# Patient Record
Sex: FEMALE | Race: WHITE | NOT HISPANIC OR LATINO | Employment: UNEMPLOYED | URBAN - METROPOLITAN AREA
[De-identification: names, ages, dates, MRNs, and addresses within clinical notes are randomized per-mention and may not be internally consistent; named-entity substitution may affect disease eponyms.]

---

## 2017-09-25 ENCOUNTER — GENERIC CONVERSION - ENCOUNTER (OUTPATIENT)
Dept: OTHER | Facility: OTHER | Age: 32
End: 2017-09-25

## 2017-09-25 DIAGNOSIS — R53.1 WEAKNESS: ICD-10-CM

## 2017-10-11 ENCOUNTER — GENERIC CONVERSION - ENCOUNTER (OUTPATIENT)
Dept: OTHER | Facility: OTHER | Age: 32
End: 2017-10-11

## 2017-10-11 LAB
A/G RATIO (HISTORICAL): 1.7 (ref 1.2–2.2)
ALBUMIN SERPL BCP-MCNC: 4.3 G/DL (ref 3.5–5.5)
ALP SERPL-CCNC: 56 IU/L (ref 39–117)
ALT SERPL W P-5'-P-CCNC: 7 IU/L (ref 0–32)
AST SERPL W P-5'-P-CCNC: 11 IU/L (ref 0–40)
BILIRUB SERPL-MCNC: 0.3 MG/DL (ref 0–1.2)
BUN SERPL-MCNC: 12 MG/DL (ref 6–20)
BUN/CREA RATIO (HISTORICAL): 17 (ref 9–23)
CALCIUM SERPL-MCNC: 9.1 MG/DL (ref 8.7–10.2)
CHLORIDE SERPL-SCNC: 104 MMOL/L (ref 96–106)
CHOLEST SERPL-MCNC: 155 MG/DL (ref 100–199)
CO2 SERPL-SCNC: 24 MMOL/L (ref 18–29)
CREAT SERPL-MCNC: 0.7 MG/DL (ref 0.57–1)
DEPRECATED RDW RBC AUTO: 14.3 % (ref 12.3–15.4)
EGFR AFRICAN AMERICAN (HISTORICAL): 134 ML/MIN/1.73
EGFR-AMERICAN CALC (HISTORICAL): 117 ML/MIN/1.73
GLUCOSE SERPL-MCNC: 92 MG/DL (ref 65–99)
HCT VFR BLD AUTO: 35.1 % (ref 34–46.6)
HDLC SERPL-MCNC: 58 MG/DL
HGB BLD-MCNC: 11.5 G/DL (ref 11.1–15.9)
LDLC SERPL CALC-MCNC: 89 MG/DL (ref 0–99)
MCH RBC QN AUTO: 26.6 PG (ref 26.6–33)
MCHC RBC AUTO-ENTMCNC: 32.8 G/DL (ref 31.5–35.7)
MCV RBC AUTO: 81 FL (ref 79–97)
PLATELET # BLD AUTO: 221 X10E3/UL (ref 150–379)
POTASSIUM SERPL-SCNC: 4.1 MMOL/L (ref 3.5–5.2)
RBC (HISTORICAL): 4.33 X10E6/UL (ref 3.77–5.28)
SODIUM SERPL-SCNC: 141 MMOL/L (ref 134–144)
TOT. GLOBULIN, SERUM (HISTORICAL): 2.5 G/DL (ref 1.5–4.5)
TOTAL PROTEIN (HISTORICAL): 6.8 G/DL (ref 6–8.5)
TRIGL SERPL-MCNC: 42 MG/DL (ref 0–149)
VIT B12 SERPL-MCNC: 720 PG/ML (ref 211–946)
WBC # BLD AUTO: 4.1 X10E3/UL (ref 3.4–10.8)

## 2017-10-12 LAB
25(OH)D3 SERPL-MCNC: 28.7 NG/ML (ref 30–100)
ANTINUCLEAR ANTIBODIES, IFA (HISTORICAL): POSITIVE
CYCLIC CITRULLINATED PEPTIDE ANTIBODY (HISTORICAL): 4 UNITS (ref 0–19)
HBA1C MFR BLD HPLC: 5.1 % (ref 4.8–5.6)
HOMOGENEOUS PATTERN (HISTORICAL): ABNORMAL
NOTE: (HISTORICAL): ABNORMAL
SPECKLED PATTERN (HISTORICAL): ABNORMAL
TSH SERPL DL<=0.05 MIU/L-ACNC: 2.21 UIU/ML (ref 0.45–4.5)

## 2017-10-17 ENCOUNTER — GENERIC CONVERSION - ENCOUNTER (OUTPATIENT)
Dept: OTHER | Facility: OTHER | Age: 32
End: 2017-10-17

## 2017-10-17 LAB — CYCLIC CITRULLINATED PEPTIDE ANTIBODY (HISTORICAL): 4

## 2017-10-18 ENCOUNTER — GENERIC CONVERSION - ENCOUNTER (OUTPATIENT)
Dept: OTHER | Facility: OTHER | Age: 32
End: 2017-10-18

## 2018-01-15 NOTE — RESULT NOTES
Verified Results  (1) COMPREHENSIVE METABOLIC PANEL [id number] 42:22UK Joanna Dash duplicate report has been generated due to demographic updates       Test Name Result Flag Reference   Glucose, Serum 92 mg/dL  65-99   BUN 12 mg/dL  6-20   Creatinine, Serum 0 70 mg/dL  0 57-1 00   BUN/Creatinine Ratio 17  9-23   Sodium, Serum 141 mmol/L  134-144   Potassium, Serum 4 1 mmol/L  3 5-5 2   Chloride, Serum 104 mmol/L     Carbon Dioxide, Total 24 mmol/L  18-29   Calcium, Serum 9 1 mg/dL  8 7-10 2   Protein, Total, Serum 6 8 g/dL  6 0-8 5   Albumin, Serum 4 3 g/dL  3 5-5 5   Globulin, Total 2 5 g/dL  1 5-4 5   A/G Ratio 1 7  1 2-2 2   Bilirubin, Total 0 3 mg/dL  0 0-1 2   Alkaline Phosphatase, S 56 IU/L     AST (SGOT) 11 IU/L  0-40   ALT (SGPT) 7 IU/L  0-32   eGFR If NonAfricn Am 117 mL/min/1 73  >59   eGFR If Africn Am 134 mL/min/1 73  >59

## 2018-01-22 VITALS
HEIGHT: 67 IN | WEIGHT: 152 LBS | BODY MASS INDEX: 23.86 KG/M2 | DIASTOLIC BLOOD PRESSURE: 58 MMHG | TEMPERATURE: 98 F | SYSTOLIC BLOOD PRESSURE: 94 MMHG | HEART RATE: 80 BPM

## 2018-08-26 ENCOUNTER — HOSPITAL ENCOUNTER (EMERGENCY)
Facility: HOSPITAL | Age: 33
Discharge: HOME | End: 2018-08-26
Attending: EMERGENCY MEDICINE
Payer: COMMERCIAL

## 2018-08-26 ENCOUNTER — APPOINTMENT (EMERGENCY)
Dept: RADIOLOGY | Facility: HOSPITAL | Age: 33
End: 2018-08-26
Payer: COMMERCIAL

## 2018-08-26 VITALS
WEIGHT: 150 LBS | OXYGEN SATURATION: 97 % | HEIGHT: 66 IN | RESPIRATION RATE: 20 BRPM | SYSTOLIC BLOOD PRESSURE: 141 MMHG | DIASTOLIC BLOOD PRESSURE: 67 MMHG | BODY MASS INDEX: 24.11 KG/M2 | HEART RATE: 91 BPM | TEMPERATURE: 98.4 F

## 2018-08-26 DIAGNOSIS — J18.9 COMMUNITY ACQUIRED PNEUMONIA: Primary | ICD-10-CM

## 2018-08-26 PROCEDURE — 99283 EMERGENCY DEPT VISIT LOW MDM: CPT

## 2018-08-26 PROCEDURE — 71046 X-RAY EXAM CHEST 2 VIEWS: CPT

## 2018-08-26 RX ORDER — LEVOFLOXACIN 500 MG/1
500 TABLET, FILM COATED ORAL EVERY 24 HOURS
Qty: 7 TABLET | Refills: 0 | Status: SHIPPED | OUTPATIENT
Start: 2018-08-26 | End: 2018-09-02

## 2018-08-26 NOTE — ED PROVIDER NOTES
History  Chief Complaint   Patient presents with    Cough     Patient states she has been sick for two weeks with cough, sore throat, fever, and nasal congestion  Patient has been taking OTC medications and it;'s not getting any better     29year old female p/w productive cough  2 weeks  Yellow sputum  No sob  Does not smoke  Was recently visiting sick people in the hospital at [address]  Overall feels well  Has LLL rhonci  A/P: cough  cxr to r/o pna  Uri sx as well  Cough   Cough characteristics:  Productive  Sputum characteristics:  Yellow  Severity:  Moderate  Onset quality:  Gradual  Duration:  2 weeks  Timing:  Constant  Progression:  Waxing and waning  Smoker: no    Context: sick contacts and upper respiratory infection    Relieved by:  Nothing  Worsened by:  Nothing  Associated symptoms: chills and myalgias    Associated symptoms: no chest pain, no diaphoresis, no fever, no headaches, no shortness of breath and no wheezing             None       History reviewed  No pertinent past medical history  History reviewed  No pertinent surgical history  History reviewed  No pertinent family history  I have reviewed and agree with the history as documented  Social History   Substance Use Topics    Smoking status: Never Smoker    Smokeless tobacco: Never Used    Alcohol use No        Review of Systems   Constitutional: Positive for chills  Negative for diaphoresis, fatigue and fever  HENT: Negative for facial swelling and nosebleeds  Eyes: Negative for pain and visual disturbance  Respiratory: Positive for cough  Negative for apnea, shortness of breath and wheezing  Cardiovascular: Negative for chest pain and leg swelling  Gastrointestinal: Negative for abdominal distention, abdominal pain, anal bleeding, blood in stool, nausea, rectal pain and vomiting  Genitourinary: Negative for difficulty urinating, dysuria and flank pain  Musculoskeletal: Positive for myalgias   Negative for back pain, neck pain and neck stiffness  Neurological: Negative for dizziness, syncope, weakness, light-headedness and headaches  All other systems reviewed and are negative  Physical Exam  Physical Exam   Constitutional: She is oriented to person, place, and time  She appears well-developed and well-nourished  No distress  HENT:   Head: Normocephalic and atraumatic  Nose: Nose normal    Eyes: Conjunctivae and EOM are normal  Pupils are equal, round, and reactive to light  No scleral icterus  Neck: Normal range of motion  Neck supple  No JVD present  No tracheal deviation present  No thyromegaly present  Cardiovascular: Normal rate, regular rhythm, normal heart sounds and intact distal pulses  Exam reveals no gallop and no friction rub  Pulmonary/Chest: Effort normal and breath sounds normal  No respiratory distress  She has no wheezes  She has no rales  She exhibits no tenderness  Abdominal: Soft  Bowel sounds are normal  She exhibits no distension and no mass  There is no tenderness  There is no rebound and no guarding  No hernia  Musculoskeletal: Normal range of motion  She exhibits no edema, tenderness or deformity  Neurological: She is alert and oriented to person, place, and time  She has normal reflexes  No cranial nerve deficit  Coordination normal    Skin: Skin is warm and dry  She is not diaphoretic  No erythema  Psychiatric: She has a normal mood and affect  Her behavior is normal    Nursing note and vitals reviewed        Vital Signs  ED Triage Vitals [08/26/18 1224]   Temperature Pulse Respirations Blood Pressure SpO2   98 4 °F (36 9 °C) 91 20 141/67 97 %      Temp src Heart Rate Source Patient Position - Orthostatic VS BP Location FiO2 (%)   -- -- -- -- --      Pain Score       8           Vitals:    08/26/18 1224   BP: 141/67   Pulse: 91       Visual Acuity      ED Medications  Medications - No data to display    Diagnostic Studies  Results Reviewed     None XR chest 2 views   ED Interpretation by Micheal Meek DO (08/26 2381)   lll pna early infiltrate      Final Result by Marguerite Loja MD (08/27 2867)      Interval development of lingular pneumonia          Findings are consistent with emergency provider's preliminary reading                     Workstation performed: [id number]                    Procedures  Procedures       Phone Contacts  ED Phone Contact    ED Course                               MDM  Number of Diagnoses or Management Options  Community acquired pneumonia: new and requires workup  Diagnosis management comments: Likely lll pna, will tx with levaquin  Amount and/or Complexity of Data Reviewed  Tests in the radiology section of CPT®: ordered and reviewed  Independent visualization of images, tracings, or specimens: yes    Patient Progress  Patient progress: stable    CritCare Time    Disposition  Final diagnoses:   Community acquired pneumonia     Time reflects when diagnosis was documented in both MDM as applicable and the Disposition within this note     Time User Action Codes Description Comment    8/26/2018 12:42 PM Rehan Javed Add [J18 9] Community acquired pneumonia       ED Disposition     ED Disposition Condition Comment    Discharge  Roxanna Capellan discharge to home/self care  Condition at discharge: Good        Follow-up Information    None         Discharge Medication List as of 8/26/2018 12:42 PM      START taking these medications    Details   levofloxacin (LEVAQUIN) 500 mg tablet Take 1 tablet (500 mg total) by mouth every 24 hours for 7 days, Starting Sun 8/26/2018, Until Sun 9/2/2018, Print           No discharge procedures on file      ED Provider  Electronically Signed by           Micheal Meek DO  08/27/18 152

## 2018-08-26 NOTE — ED NOTES
Patient has few scattered rhonchi    Patient states she is expectoarting yellow mucous     Elisabeth Seth, DANICA  08/26/18 9623

## 2018-08-26 NOTE — DISCHARGE INSTRUCTIONS
Community Acquired Pneumonia   WHAT YOU NEED TO KNOW:   Community-acquired pneumonia (CAP) is a lung infection that you get outside of a hospital or nursing home setting  Your lungs become inflamed and cannot work well  CAP may be caused by bacteria, viruses, or fungi  DISCHARGE INSTRUCTIONS:   Return to the emergency department if:   · You are confused and cannot think clearly  · You have increased trouble breathing  · Your lips or fingernails turn gray or blue  Contact your healthcare provider if:   · Your symptoms do not get better, or they get worse  · You are urinating less, or not at all  · You have questions or concerns about your condition or care  Medicines:   · Medicines  may be given to treat a bacterial, viral, or fungal infection  You may also be given medicines to dilate your bronchial tubes to help you breathe more easily  · Take your medicine as directed  Contact your healthcare provider if you think your medicine is not helping or if you have side effects  Tell him or her if you are allergic to any medicine  Keep a list of the medicines, vitamins, and herbs you take  Include the amounts, and when and why you take them  Bring the list or the pill bottles to follow-up visits  Carry your medicine list with you in case of an emergency  Follow up with your healthcare provider within 3 days or as directed: You may need another x-ray  Write down your questions so you remember to ask them during your visits  Deep breathing and coughing:  Deep breathing helps open the air passages in your lungs  Coughing helps bring up mucus from your lungs  Take a deep breath and hold the breath as long as you can  Then push the air out of your lungs with a deep, strong cough  Spit out any mucus you have coughed up  Take 10 deep breaths in a row every hour that you are awake  Remember to follow each deep breath with a cough     Do not smoke or allow others to smoke around you:  Nicotine and other chemicals in cigarettes and cigars can cause lung damage  Ask your healthcare provider for information if you currently smoke and need help to quit  E-cigarettes or smokeless tobacco still contain nicotine  Talk to your healthcare provider before you use these products  Manage CAP at home:   · Breathe warm, moist air  This helps loosen mucus  Loosely place a warm, wet washcloth over your nose and mouth  A room humidifier may also help make the air moist     · Drink liquids as directed  Ask your healthcare provider how much liquid to drink each day and which liquids to drink  Liquids help make mucus thin and easier to get out of your body  · Gently tap your chest   This helps loosen mucus so it is easier to cough  Lie with your head lower than your chest several times a day and tap your chest      · Get plenty of rest   Rest helps your body heal   Prevent CAP:   · Wash your hands often with soap and water  Carry germ-killing hand gel with you  You can use the gel to clean your hands when soap and water are not available  Do not touch your eyes, nose, or mouth unless you have washed your hands first      · Clean surfaces often  Clean doorknobs, countertops, cell phones, and other surfaces that are touched often  · Always cover your mouth when you cough  Cough into a tissue or your shirtsleeve so you do not spread germs from your hands  · Try to avoid people who have a cold or the flu  If you are sick, stay away from others as much as possible  · Ask about vaccines  You may need a vaccine to help prevent pneumonia  Get an influenza (flu) vaccine every year as soon as it becomes available  © 2017 [employer] Information is for End User's use only and may not be sold, redistributed or otherwise used for commercial purposes  All illustrations and images included in CareNotes® are the copyrighted property of [employer]  or [employer]    The above information is an  only  It is not intended as medical advice for individual conditions or treatments  Talk to your doctor, nurse or pharmacist before following any medical regimen to see if it is safe and effective for you

## 2018-08-30 ENCOUNTER — VBI (OUTPATIENT)
Dept: ADMINISTRATIVE | Facility: OTHER | Age: 33
End: 2018-08-30

## 2018-09-29 NOTE — TELEPHONE ENCOUNTER
[facility]    ED Visit Information     Ed visit date: 08/26/2018  Diagnosis Description: Community acquired pneumonia  In Network? Yes [address]  Discharge status: Home  Discharged with meds ? Yes  Number of ED visits to date: 1  ED Severity:4     Outreach Information    Outreach successful: Yes 1  Date letter mailed:N/a  Date Finalized:08/30/2018    Care Coordination    Follow up appointment with pcp: no None scheduled  Transportation issues ? No    [employer] type:  7 Day Outreach  Emergent necessity warranted by diagnosis:  No  [facility] PCP:  Yes  Transportation:  Self Transport  Called PCP first?:  No  Feels able to call PCP for urgent problems ?:  Yes  Ever any problems getting appointment with PCP for minor emergency/urgency problems?:  No  Practice Contacted Patient ?:  No  Pt had ED follow up with pcp/staff ?:  No    Seen for follow-up out of network ?:  No  Reason Patient went to ED instead of Urgent Care or PCP?:  Perceived Severity of Illness  Urgent care Education?:  No  08/30/2018 02:03 PM Phone ([employer] Neel Figueroa ([employer]) [phone] (H)   Call Complete - Personal communication with patient:    Patient stated that she is doing well s/p ED visit on 08/26 for Community acquired pneumonia  She had been experiencing difficulty breathing  Cough has improved  Patient was discharged with medication  Patient declined to scheduled a /u appt wit pcp but stated that she will call and get an appt at a later date, if needed  Patient does not meet OPCM criteria and denies any transportation issues  Patient is aware of her nearest [place] [address] urgent care facility

## 2019-03-22 ENCOUNTER — OFFICE VISIT (OUTPATIENT)
Dept: URGENT CARE | Facility: CLINIC | Age: 34
End: 2019-03-22
Payer: COMMERCIAL

## 2019-03-22 VITALS
DIASTOLIC BLOOD PRESSURE: 62 MMHG | HEART RATE: 90 BPM | SYSTOLIC BLOOD PRESSURE: 118 MMHG | OXYGEN SATURATION: 98 % | TEMPERATURE: 97.8 F | BODY MASS INDEX: 24.33 KG/M2 | RESPIRATION RATE: 16 BRPM | HEIGHT: 67 IN | WEIGHT: 155 LBS

## 2019-03-22 DIAGNOSIS — J02.9 SORE THROAT: Primary | ICD-10-CM

## 2019-03-22 LAB — S PYO AG THROAT QL: NEGATIVE

## 2019-03-22 PROCEDURE — 87430 STREP A AG IA: CPT | Performed by: PREVENTIVE MEDICINE

## 2019-03-22 PROCEDURE — 99283 EMERGENCY DEPT VISIT LOW MDM: CPT | Performed by: PREVENTIVE MEDICINE

## 2019-03-22 PROCEDURE — G0382 LEV 3 HOSP TYPE B ED VISIT: HCPCS | Performed by: PREVENTIVE MEDICINE

## 2019-03-22 NOTE — PROGRESS NOTES
[facility]        NAME: Verena Jansen is a 34 y o  female  : 1985    MRN: [id number]  DATE: 2019  TIME: 3:44 PM    Assessment and Plan   Sore throat [J02 9]  1  Sore throat  POCT rapid strepA         Patient Instructions       Follow up with PCP in 3-5 days  Proceed to  ER if symptoms worsen  Chief Complaint     Chief Complaint   Patient presents with    Sore Throat     Began two days ago  fever (tmax 103), sore throat and left ear pain         History of Present Illness       She was sick last week with head cold type symptoms  Began to get better and now has developed sore throat eye intermittent fever  Review of Systems   Review of Systems   Constitutional: Positive for fatigue and fever  HENT: Positive for sore throat  Current Medications     No current outpatient medications on file  Current Allergies     Allergies as of 2019    (No Known Allergies)            The following portions of the patient's history were reviewed and updated as appropriate: allergies, current medications, past family history, past medical history, past social history, past surgical history and problem list      Past Medical History:   Diagnosis Date    Patient denies medical problems        No past surgical history on file  No family history on file  Medications have been verified  Objective   /62   Pulse 90   Temp 97 8 °F (36 6 °C)   Resp 16   Ht 5' 7" (1 702 m)   Wt 70 3 kg (155 lb)   SpO2 98%   BMI 24 28 kg/m²        Physical Exam     Physical Exam   HENT:   Right Ear: Tympanic membrane normal    Left Ear: Tympanic membrane normal    Mouth/Throat: Oropharynx is clear and moist    Cardiovascular: Normal heart sounds  Pulmonary/Chest: Breath sounds normal    Lymphadenopathy:     She has no cervical adenopathy       Strep screen negative at 5 minutes

## 2019-03-22 NOTE — PATIENT INSTRUCTIONS
Hot salt water gargles may be helpful  Cepacol lozenges for pain  Motrin or Tylenol for fever, chills or aches  Follow up with PCP in 3-5 days if no improvement in symptoms  We will call you with a throat culture turns positive

## 2019-03-24 LAB — B-HEM STREP SPEC QL CULT: NEGATIVE

## 2019-09-02 ENCOUNTER — HOSPITAL ENCOUNTER (EMERGENCY)
Facility: HOSPITAL | Age: 34
Discharge: HOME | End: 2019-09-02
Attending: EMERGENCY MEDICINE | Admitting: EMERGENCY MEDICINE
Payer: COMMERCIAL

## 2019-09-02 VITALS
HEIGHT: 67 IN | HEART RATE: 66 BPM | WEIGHT: 157 LBS | OXYGEN SATURATION: 98 % | BODY MASS INDEX: 24.64 KG/M2 | RESPIRATION RATE: 18 BRPM | TEMPERATURE: 98.2 F | DIASTOLIC BLOOD PRESSURE: 62 MMHG | SYSTOLIC BLOOD PRESSURE: 116 MMHG

## 2019-09-02 DIAGNOSIS — S61.012A LACERATION OF LEFT THUMB: Primary | ICD-10-CM

## 2019-09-02 PROCEDURE — 12001 RPR S/N/AX/GEN/TRNK 2.5CM/<: CPT | Performed by: PHYSICIAN ASSISTANT

## 2019-09-02 PROCEDURE — 99283 EMERGENCY DEPT VISIT LOW MDM: CPT

## 2019-09-02 PROCEDURE — 99282 EMERGENCY DEPT VISIT SF MDM: CPT | Performed by: PHYSICIAN ASSISTANT

## 2019-09-02 PROCEDURE — 90715 TDAP VACCINE 7 YRS/> IM: CPT | Performed by: PHYSICIAN ASSISTANT

## 2019-09-02 PROCEDURE — 90471 IMMUNIZATION ADMIN: CPT

## 2019-09-02 RX ORDER — FERROUS SULFATE 325(65) MG
325 TABLET ORAL
COMMUNITY
End: 2022-03-18 | Stop reason: ALTCHOICE

## 2019-09-02 RX ORDER — LIDOCAINE HYDROCHLORIDE 10 MG/ML
5 INJECTION, SOLUTION EPIDURAL; INFILTRATION; INTRACAUDAL; PERINEURAL ONCE
Status: COMPLETED | OUTPATIENT
Start: 2019-09-02 | End: 2019-09-02

## 2019-09-02 RX ORDER — BIOTIN 1 MG
1000 TABLET ORAL 3 TIMES DAILY
COMMUNITY
End: 2019-09-10 | Stop reason: ALTCHOICE

## 2019-09-02 RX ORDER — GINSENG 100 MG
1 CAPSULE ORAL ONCE
Status: COMPLETED | OUTPATIENT
Start: 2019-09-02 | End: 2019-09-02

## 2019-09-02 RX ADMIN — TETANUS TOXOID, REDUCED DIPHTHERIA TOXOID AND ACELLULAR PERTUSSIS VACCINE, ADSORBED 0.5 ML: 5; 2.5; 8; 8; 2.5 SUSPENSION INTRAMUSCULAR at 12:56

## 2019-09-02 RX ADMIN — BACITRACIN 1 SMALL APPLICATION: 500 OINTMENT TOPICAL at 12:58

## 2019-09-02 RX ADMIN — LIDOCAINE HYDROCHLORIDE 5 ML: 10 INJECTION, SOLUTION EPIDURAL; INFILTRATION; INTRACAUDAL; PERINEURAL at 12:58

## 2019-09-02 NOTE — ED PROVIDER NOTES
History  Chief Complaint   Patient presents with    Hand Laceration     Patient states she was washing dishes and the glass broke and cut her L hand     Patient is a 29 y/o F that presents to the ED with laceration to left dorsal thumb that occurred 30 minutes ago at home  Patient was washing a cup and it broke and cut her thumb  No Numbness or tingling  Last tetanus is unknown  History provided by:  Patient  Laceration   Location:  Finger  Finger laceration location:  L thumb  Length:  2 5cm  Depth: Through dermis  Quality: straight    Time since incident:  30 minutes  Laceration mechanism:  Broken glass  Pain details:     Quality:  Aching    Severity:  No pain    Timing:  Constant  Foreign body present:  No foreign bodies  Relieved by:  Nothing  Worsened by:  Nothing  Ineffective treatments:  None tried  Tetanus status:  Out of date  Associated symptoms: no fever, no numbness and no swelling        Prior to Admission Medications   Prescriptions Last Dose Informant Patient Reported? Taking? Biotin 1000 MCG tablet   Yes No   Sig: Take 1,000 mcg by mouth Three times a day   Cholecalciferol 2000 units TABS   Yes No   Sig: Take by mouth   ferrous sulfate 325 (65 Fe) mg tablet   Yes No   Sig: Take 325 mg by mouth      Facility-Administered Medications: None       Past Medical History:   Diagnosis Date    Patient denies medical problems        Past Surgical History:   Procedure Laterality Date    OVARIAN CYST REMOVAL         History reviewed  No pertinent family history  I have reviewed and agree with the history as documented  Social History     Tobacco Use    Smoking status: Never Smoker    Smokeless tobacco: Never Used   Substance Use Topics    Alcohol use: No    Drug use: No        Review of Systems   Constitutional: Negative for chills and fever  Skin: Positive for wound  Neurological: Negative for dizziness, weakness and numbness     All other systems reviewed and are negative  Physical Exam  Physical Exam   Constitutional: She is oriented to person, place, and time  She appears well-developed and well-nourished  HENT:   Head: Normocephalic and atraumatic  Eyes: Conjunctivae are normal    Cardiovascular: Normal rate, regular rhythm and normal heart sounds  Pulses:       Radial pulses are 2+ on the left side  Pulmonary/Chest: Effort normal and breath sounds normal    Musculoskeletal:        Left hand: She exhibits tenderness and laceration  She exhibits normal range of motion, no bony tenderness and no deformity  Normal sensation noted  Normal strength noted  Hands:  Neurological: She is alert and oriented to person, place, and time  She has normal strength  No sensory deficit  Skin: Skin is warm and dry  Laceration (left dorsal thumb) noted  No rash noted  She is not diaphoretic  No pallor  Nursing note and vitals reviewed  Vital Signs  ED Triage Vitals [09/02/19 1247]   Temperature Pulse Respirations Blood Pressure SpO2   98 2 °F (36 8 °C) 66 18 116/62 98 %      Temp src Heart Rate Source Patient Position - Orthostatic VS BP Location FiO2 (%)   -- -- -- -- --      Pain Score       Worst Possible Pain           Vitals:    09/02/19 1247   BP: 116/62   Pulse: 66         Visual Acuity      ED Medications  Medications   lidocaine (PF) (XYLOCAINE-MPF) 1 % injection 5 mL (5 mL Infiltration Given 9/2/19 1258)   bacitracin topical ointment 1 small application (1 small application Topical Given 9/2/19 1258)   tetanus-diphtheria-acellular pertussis (BOOSTRIX) IM injection 0 5 mL (0 5 mL Intramuscular Given 9/2/19 1256)       Diagnostic Studies  Results Reviewed     None                 No orders to display              Procedures  Laceration repair  Date/Time: 9/2/2019 1:10 PM  Performed by: Demetria Luna PA-C  Authorized by: Demetria Luna PA-C   Consent: Verbal consent obtained    Risks and benefits: risks, benefits and alternatives were discussed  Consent given by: patient  Body area: upper extremity  Location details: left thumb  Laceration length: 2 5 cm  Foreign bodies: no foreign bodies  Anesthesia: local infiltration    Anesthesia:  Local Anesthetic: lidocaine 1% without epinephrine  Anesthetic total: 2 mL      Procedure Details:  Preparation: Patient was prepped and draped in the usual sterile fashion  Irrigation solution: saline  Irrigation method: tap  Amount of cleaning: standard  Debridement: none  Degree of undermining: none  Skin closure: 4-0 nylon  Number of sutures: 7  Technique: simple  Approximation difficulty: simple  Dressing: antibiotic ointment and gauze roll  Patient tolerance: Patient tolerated the procedure well with no immediate complications             ED Course                               MDM  Number of Diagnoses or Management Options  Laceration of left thumb: minor  Patient Progress  Patient progress: stable      Disposition  Final diagnoses:   Laceration of left thumb     Time reflects when diagnosis was documented in both MDM as applicable and the Disposition within this note     Time User Action Codes Description Comment    9/2/2019  1:18 PM Evelyn Pope Add [I11 311M] Laceration of left thumb       ED Disposition     ED Disposition Condition Date/Time Comment    Discharge Stable Mon Sep 2, 2019  1:18 PM Roxanna Capellan discharge to home/self care  Follow-up Information     Follow up With Specialties Details Why Contact Info    Zak Gupta MD Internal Medicine Call in 1 week For suture removal [facility]  [address]  [phone]            Patient's Medications   Discharge Prescriptions    No medications on file     No discharge procedures on file      ED Provider  Electronically Signed by           Dedra Chicas PA-C  09/02/19 9042

## 2019-09-02 NOTE — DISCHARGE INSTRUCTIONS
Rest, elevate hand  Tylenol/motrin for discomfort  Keep wound clean and dry for next 2 days, then remove bandage and clean daily with soap and water and apply antibiotic ointment    Follow up with family doctor in 7-10 days for suture removal

## 2019-09-06 ENCOUNTER — VBI (OUTPATIENT)
Dept: ADMINISTRATIVE | Facility: OTHER | Age: 34
End: 2019-09-06

## 2019-09-06 NOTE — TELEPHONE ENCOUNTER
Alexandre Werner    ED Visit Information     Ed visit date: 9/2/2019  Diagnosis Description: Laceration of left thumb  In Network? Yes [facility]  Discharge status: Home  Discharged with meds ? No  Number of ED visits to date: 1  ED Severity:n/a     Outreach Information    Outreach successful: No 1  Date letter mailed:n/a  Date Finalized:9/6/2019    Care Coordination    Follow up appointment with pcp: no No ED f/u appt scheduled  Transportation issues ? NA    [employer] type:  7 Day Outreach  Patient refsued the answer questions:  Yes  Emergent necessity warranted by diagnosis:  No  [facility] PCP:  Yes  Transportation:  Friend/Family Transport  09/06/2019 03:38 PM Phone ([employer]) Rosmery Alonzo (Self) [phone] (H)   Call Complete  Personal communication with patient regarding recent ED visit on 9/2 for Laceration of left thumb  Patient was discharged without medication and advised to follow up with PCP for suture removal (7)  Patient stated that her thumb is doing better today  I was not able to accommodate patient with scheduling an appt due to the strict guidelines we most follow  Patient stated that she will call to schedule  Patient was not able to answer follow up questions due to her children arriving home from school at the time of my call

## 2019-09-10 ENCOUNTER — OFFICE VISIT (OUTPATIENT)
Dept: FAMILY MEDICINE CLINIC | Facility: HOSPITAL | Age: 34
End: 2019-09-10
Payer: COMMERCIAL

## 2019-09-10 VITALS
SYSTOLIC BLOOD PRESSURE: 96 MMHG | DIASTOLIC BLOOD PRESSURE: 66 MMHG | BODY MASS INDEX: 24.99 KG/M2 | WEIGHT: 159.2 LBS | HEART RATE: 73 BPM | HEIGHT: 67 IN

## 2019-09-10 DIAGNOSIS — S61.412D LACERATION OF LEFT HAND WITHOUT FOREIGN BODY, SUBSEQUENT ENCOUNTER: ICD-10-CM

## 2019-09-10 DIAGNOSIS — S36.039A: Primary | ICD-10-CM

## 2019-09-10 PROCEDURE — 99213 OFFICE O/P EST LOW 20 MIN: CPT | Performed by: PHYSICIAN ASSISTANT

## 2019-09-10 NOTE — PROGRESS NOTES
Assessment/Plan:    Left hand injury/laceration-  Follow up from ER visit 9/2/19  Subjective:      Patient ID: Lamar Antunez is a 30 y o  female  30year old white female presents to have 7 sutures removed from left thumb area  Went to ER on 9/2/19 after injury, with broken cup at home, had laceration repair in ER same day as injury  No increased pain, discharge or swelling  Suture / Staple Removal           Review of Systems   Constitutional: Negative for chills, diaphoresis, fatigue and fever  Respiratory: Negative for cough and shortness of breath  Gastrointestinal: Negative for abdominal pain, nausea and vomiting  Neurological: Negative for weakness and numbness  Objective:      BP 96/66   Pulse 73   Ht 5' 7" (1 702 m)   Wt 72 2 kg (159 lb 3 2 oz)   LMP 09/02/2019   BMI 24 93 kg/m²          Physical Exam   Constitutional: She is oriented to person, place, and time  She appears well-developed and well-nourished  No distress  Pulmonary/Chest: Effort normal and breath sounds normal  No stridor  No respiratory distress  She has no wheezes  She has no rales  Musculoskeletal: Normal range of motion  She exhibits no edema, tenderness or deformity  Laceration repaired with 7 sutures  No sign of infection  Neurological: She is alert and oriented to person, place, and time  Skin: She is not diaphoretic  Nursing note and vitals reviewed

## 2019-09-10 NOTE — PATIENT INSTRUCTIONS
Area cleaned with Betadine, and Peroxide, attempted to remove sutures but skin not healed well, easily  when 1st suture pulled off  Mupirocin placed on skin and area bandaged  Patient to return Friday

## 2019-09-18 ENCOUNTER — OFFICE VISIT (OUTPATIENT)
Dept: FAMILY MEDICINE CLINIC | Facility: HOSPITAL | Age: 34
End: 2019-09-18
Payer: COMMERCIAL

## 2019-09-18 VITALS
HEIGHT: 67 IN | SYSTOLIC BLOOD PRESSURE: 108 MMHG | BODY MASS INDEX: 25.3 KG/M2 | DIASTOLIC BLOOD PRESSURE: 60 MMHG | HEART RATE: 82 BPM | WEIGHT: 161.2 LBS

## 2019-09-18 DIAGNOSIS — Z09: Primary | ICD-10-CM

## 2019-09-18 PROCEDURE — 99213 OFFICE O/P EST LOW 20 MIN: CPT | Performed by: PHYSICIAN ASSISTANT

## 2019-09-18 PROCEDURE — 3008F BODY MASS INDEX DOCD: CPT | Performed by: PHYSICIAN ASSISTANT

## 2019-09-18 NOTE — PROGRESS NOTES
Assessment/Plan:         Diagnoses and all orders for this visit:    Examination, follow-up for injury  -     Suture removal        Subjective:      Patient ID: Mildred Tolbert is a 30 y o  female  30year old white female presents to have sutures removed  No increased pain, swelling or discharge  Review of Systems   Constitutional: Negative for chills, diaphoresis, fatigue and fever  Respiratory: Negative for cough and shortness of breath            Objective:      /60 (BP Location: Right arm, Patient Position: Sitting, Cuff Size: Standard)   Pulse 82   Ht 5' 7" (1 702 m)   Wt 73 1 kg (161 lb 3 2 oz)   LMP 09/02/2019   BMI 25 25 kg/m²          Physical Exam

## 2019-09-18 NOTE — PROGRESS NOTES
Suture removal  Date/Time: 9/18/2019 10:29 AM  Performed by: Paras Sargent PA-C  Authorized by: Paras Sargent PA-C     Patient location:  Clinic  Other Assisting Provider: No    Consent:     Consent obtained:  Verbal    Consent given by:  Patient    Risks discussed:  Wound separation  Universal protocol:     Patient identity confirmed:  Verbally with patient  Location:     Laterality:  Left    Location:  Upper extremity  Procedure details: Tools used:  Suture removal kit    Wound appearance:  No sign(s) of infection  Post-procedure details:     Post-removal:  Antibiotic ointment applied, dressing applied and Band-Aid applied    Patient tolerance of procedure:   Tolerated well, no immediate complications

## 2019-09-20 ENCOUNTER — TELEPHONE (OUTPATIENT)
Dept: FAMILY MEDICINE CLINIC | Facility: HOSPITAL | Age: 34
End: 2019-09-20

## 2022-03-18 ENCOUNTER — OFFICE VISIT (OUTPATIENT)
Dept: FAMILY MEDICINE CLINIC | Facility: HOSPITAL | Age: 37
End: 2022-03-18
Payer: COMMERCIAL

## 2022-03-18 VITALS
DIASTOLIC BLOOD PRESSURE: 68 MMHG | BODY MASS INDEX: 26.93 KG/M2 | OXYGEN SATURATION: 98 % | HEIGHT: 67 IN | WEIGHT: 171.6 LBS | HEART RATE: 74 BPM | SYSTOLIC BLOOD PRESSURE: 112 MMHG

## 2022-03-18 DIAGNOSIS — R53.82 CHRONIC FATIGUE: ICD-10-CM

## 2022-03-18 DIAGNOSIS — N92.1 MENORRHAGIA WITH IRREGULAR CYCLE: ICD-10-CM

## 2022-03-18 DIAGNOSIS — Z00.00 ANNUAL PHYSICAL EXAM: Primary | ICD-10-CM

## 2022-03-18 DIAGNOSIS — R45.86 MOOD SWINGS: ICD-10-CM

## 2022-03-18 PROCEDURE — 3725F SCREEN DEPRESSION PERFORMED: CPT | Performed by: INTERNAL MEDICINE

## 2022-03-18 PROCEDURE — 99395 PREV VISIT EST AGE 18-39: CPT | Performed by: INTERNAL MEDICINE

## 2022-03-18 PROCEDURE — 1036F TOBACCO NON-USER: CPT | Performed by: INTERNAL MEDICINE

## 2022-03-18 PROCEDURE — 3008F BODY MASS INDEX DOCD: CPT | Performed by: INTERNAL MEDICINE

## 2022-03-18 NOTE — PROGRESS NOTES
[facility] PRIMARY CARE [address]    NAME: Talya Nassar  AGE: 31 y o  SEX: female  : 1985     DATE: 3/18/2022     Assessment and Plan:     Problem List Items Addressed This Visit        Other    Menorrhagia with irregular cycle    Chronic fatigue      Other Visit Diagnoses     Annual physical exam    -  Primary    Mood swings              Immunizations and preventive care screenings were discussed with patient today  Appropriate education was printed on patient's after visit summary  Counseling:  · no regualr etoh use    BMI Counseling: Body mass index is 26 88 kg/m²  The BMI is above normal  Nutrition recommendations include encouraging healthy choices of fruits and vegetables and moderation in carbohydrate intake  Rationale for BMI follow-up plan is due to patient being overweight or obese  Depression Screening and Follow-up Plan: Patient was screened for depression during today's encounter  They screened negative with a PHQ-2 score of 1  No follow-ups on file  Chief Complaint:     Chief Complaint   Patient presents with    Physical Exam     Pt would like labs done - having heavy periods       History of Present Illness:     Adult Annual Physical   Patient here for a comprehensive physical exam  The patient reports problems - heavy menses - tiredness  Diet and Physical Activity  · Diet/Nutrition: well balanced diet  · Exercise: walking  Depression Screening  PHQ-2/9 Depression Screening    Little interest or pleasure in doing things: 0 - not at all  Feeling down, depressed, or hopeless: 1 - several days  PHQ-2 Score: 1  PHQ-2 Interpretation: Negative depression screen       General Health  · Sleep: gets 7-8 hours of sleep on average  · Hearing: normal - bilateral   · Vision: wears contacts  · Dental: regular dental visits         /GYN Health  · Last menstrual period: 3/15/22  · Contraceptive method: barrier methods  · History of STDs?: no      Review of Systems:     Review of Systems   Constitutional: Positive for fatigue and unexpected weight change  Had  ectopric pregnancy a year ago- after that has weight gain- sisters had thyroid issues- hashimotos- also pt lived in [place] after [place] -moved to [facility] at age 7  Respiratory: Negative for shortness of breath  Musculoskeletal: Negative for arthralgias and back pain  Some shoulder pain   All other systems reviewed and are negative       Past Medical History:     Past Medical History:   Diagnosis Date    Chicken pox     Patient denies medical problems       Past Surgical History:     Past Surgical History:   Procedure Laterality Date    OVARIAN CYST REMOVAL        Social History:     Social History     Socioeconomic History    Marital status: /Civil Union     Spouse name: Not on file    Number of children: Not on file    Years of education: Not on file    Highest education level: Not on file   Occupational History    Not on file   Tobacco Use    Smoking status: Never Smoker    Smokeless tobacco: Never Used   Substance and Sexual Activity    Alcohol use: No    Drug use: No    Sexual activity: Not on file   Other Topics Concern    Not on file   Social History Narrative    Always uses seat belt    Caffeine use    Employed    Lives with     Living situation: Supportive and safe    No living will    No tobacco smoke exposure     Social Determinants of Health     Financial Resource Strain: Not on file   Food Insecurity: Not on file   Transportation Needs: Not on file   Physical Activity: Not on file   Stress: Not on file   Social Connections: Not on file   Intimate Partner Violence: Not on file   Housing Stability: Not on file      Family History:     Family History   Problem Relation Age of Onset    Thyroid disease Sister     Stroke Grandchild     Hypertension Family     Hyperlipidemia Family       Current Medications:     No current outpatient medications on file  No current facility-administered medications for this visit  Allergies:     No Known Allergies   Physical Exam:     /68   Pulse 74   Ht 5' 7" (1 702 m)   Wt 77 8 kg (171 lb 9 6 oz)   SpO2 98%   BMI 26 88 kg/m²     Physical Exam  Vitals and nursing note reviewed  Constitutional:       Appearance: Normal appearance  HENT:      Head: Normocephalic and atraumatic  Mouth/Throat:      Pharynx: No oropharyngeal exudate or posterior oropharyngeal erythema  Eyes:      General:         Right eye: No discharge  Left eye: No discharge  Neck:      Comments: Mild  Thyroid fullness  Cardiovascular:      Rate and Rhythm: Normal rate and regular rhythm  Pulmonary:      Effort: No respiratory distress  Breath sounds: No rhonchi  Abdominal:      General: There is no distension  Palpations: There is no mass  Musculoskeletal:         General: No swelling or tenderness  Cervical back: No rigidity or tenderness  Skin:     Coloration: Skin is not jaundiced  Findings: No erythema or rash  Neurological:      General: No focal deficit present  Mental Status: She is alert and oriented to person, place, and time  Psychiatric:         Mood and Affect: Mood normal          Thought Content:  Thought content normal          Judgment: Judgment normal           Meli Boyer DO   [facility] PRIMARY CARE [address]

## 2022-03-18 NOTE — PATIENT INSTRUCTIONS
Do fasting lab   Call to get thyroid us scheduled  Wellness Visit for Adults   AMBULATORY CARE:   A wellness visit  is when you see your healthcare provider to get screened for health problems  Your healthcare provider will also give you advice on how to stay healthy  Write down your questions so you remember to ask them  Ask your healthcare provider how often you should have a wellness visit  What happens at a wellness visit:  Your healthcare provider will ask about your health, and your family history of health problems  This includes high blood pressure, heart disease, and cancer  He or she will ask if you have symptoms that concern you, if you smoke, and about your mood  You may also be asked about your intake of medicines, supplements, food, and alcohol  Any of the following may be done:  · Your weight  will be checked  Your height may also be checked so your body mass index (BMI) can be calculated  Your BMI shows if you are at a healthy weight  · Your blood pressure  and heart rate will be checked  Your temperature may also be checked  · Blood and urine tests  may be done  Blood tests may be done to check your cholesterol levels  Abnormal cholesterol levels increase your risk for heart disease and stroke  You may also need a blood or urine test to check for diabetes if you are at increased risk  Urine tests may be done to look for signs of an infection or kidney disease  · A physical exam  includes checking your heartbeat and lungs with a stethoscope  Your healthcare provider may also check your skin to look for sun damage  · Screening tests  may be recommended  A screening test is done to check for diseases that may not cause symptoms  The screening tests you may need depend on your age, gender, family history, and lifestyle habits  For example, colorectal screening may be recommended if you are 48years old or older      Screening tests you need if you are a woman:   · A Pap smear  is used to screen for cervical cancer  Pap smears are usually done every 3 to 5 years depending on your age  You may need them more often if you have had abnormal Pap smear test results in the past  Ask your healthcare provider how often you should have a Pap smear  · A mammogram  is an x-ray of your breasts to screen for breast cancer  Experts recommend mammograms every 2 years starting at age 48 years  You may need a mammogram at age 52 years or younger if you have an increased risk for breast cancer  Talk to your healthcare provider about when you should start having mammograms and how often you need them  Vaccines you may need:   · Get an influenza vaccine  every year  The influenza vaccine protects you from the flu  Several types of viruses cause the flu  The viruses change over time, so new vaccines are made each year  · Get a tetanus-diphtheria (Td) booster vaccine  every 10 years  This vaccine protects you against tetanus and diphtheria  Tetanus is a severe infection that may cause painful muscle spasms and lockjaw  Diphtheria is a severe bacterial infection that causes a thick covering in the back of your mouth and throat  · Get a human papillomavirus (HPV) vaccine  if you are female and aged 23 to 32 or male 23 to 24 and never received it  This vaccine protects you from HPV infection  HPV is the most common infection spread by sexual contact  HPV may also cause vaginal, penile, and anal cancers  · Get a pneumococcal vaccine  if you are aged 72 years or older  The pneumococcal vaccine is an injection given to protect you from pneumococcal disease  Pneumococcal disease is an infection caused by pneumococcal bacteria  The infection may cause pneumonia, meningitis, or an ear infection  · Get a shingles vaccine  if you are 60 or older, even if you have had shingles before  The shingles vaccine is an injection to protect you from the varicella-zoster virus  This is the same virus that causes chickenpox  Shingles is a painful rash that develops in people who had chickenpox or have been exposed to the virus  How to eat healthy:  My Plate is a model for planning healthy meals  It shows the types and amounts of foods that should go on your plate  Fruits and vegetables make up about half of your plate, and grains and protein make up the other half  A serving of dairy is included on the side of your plate  The amount of calories and serving sizes you need depends on your age, gender, weight, and height  Examples of healthy foods are listed below:  · Eat a variety of vegetables  such as dark green, red, and orange vegetables  You can also include canned vegetables low in sodium (salt) and frozen vegetables without added butter or sauces  · Eat a variety of fresh fruits , canned fruit in 100% juice, frozen fruit, and dried fruit  · Include whole grains  At least half of the grains you eat should be whole grains  Examples include whole-wheat bread, wheat pasta, brown rice, and whole-grain cereals such as oatmeal     · Eat a variety of protein foods such as seafood (fish and shellfish), lean meat, and poultry without skin (turkey and chicken)  Examples of lean meats include pork leg, shoulder, or tenderloin, and beef round, sirloin, tenderloin, and extra lean ground beef  Other protein foods include eggs and egg substitutes, beans, peas, soy products, nuts, and seeds  · Choose low-fat dairy products such as skim or 1% milk or low-fat yogurt, cheese, and cottage cheese  · Limit unhealthy fats  such as butter, hard margarine, and shortening  Exercise:  Exercise at least 30 minutes per day on most days of the week  Some examples of exercise include walking, biking, dancing, and swimming  You can also fit in more physical activity by taking the stairs instead of the elevator or parking farther away from stores  Include muscle strengthening activities 2 days each week   Regular exercise provides many health benefits  It helps you manage your weight, and decreases your risk for type 2 diabetes, heart disease, stroke, and high blood pressure  Exercise can also help improve your mood  Ask your healthcare provider about the best exercise plan for you  General health and safety guidelines:   · Do not smoke  Nicotine and other chemicals in cigarettes and cigars can cause lung damage  Ask your healthcare provider for information if you currently smoke and need help to quit  E-cigarettes or smokeless tobacco still contain nicotine  Talk to your healthcare provider before you use these products  · Limit alcohol  A drink of alcohol is 12 ounces of beer, 5 ounces of wine, or 1½ ounces of liquor  · Lose weight, if needed  Being overweight increases your risk of certain health conditions  These include heart disease, high blood pressure, type 2 diabetes, and certain types of cancer  · Protect your skin  Do not sunbathe or use tanning beds  Use sunscreen with a SPF 15 or higher  Apply sunscreen at least 15 minutes before you go outside  Reapply sunscreen every 2 hours  Wear protective clothing, hats, and sunglasses when you are outside  · Drive safely  Always wear your seatbelt  Make sure everyone in your car wears a seatbelt  A seatbelt can save your life if you are in an accident  Do not use your cell phone when you are driving  This could distract you and cause an accident  Pull over if you need to make a call or send a text message  · Practice safe sex  Use latex condoms if are sexually active and have more than one partner  Your healthcare provider may recommend screening tests for sexually transmitted infections (STIs)  · Wear helmets, lifejackets, and protective gear  Always wear a helmet when you ride a bike or motorcycle, go skiing, or play sports that could cause a head injury  Wear protective equipment when you play sports  Wear a lifejacket when you are on a boat or doing water sports      © Copyright [employer] 2022 Information is for End User's use only and may not be sold, redistributed or otherwise used for commercial purposes  All illustrations and images included in CareNotes® are the copyrighted property of [employer]  or [employer]  The above information is an  only  It is not intended as medical advice for individual conditions or treatments  Talk to your doctor, nurse or pharmacist before following any medical regimen to see if it is safe and effective for you

## 2022-04-07 ENCOUNTER — HOSPITAL ENCOUNTER (OUTPATIENT)
Dept: ULTRASOUND IMAGING | Facility: HOSPITAL | Age: 37
Discharge: HOME | End: 2022-04-07
Attending: INTERNAL MEDICINE
Payer: COMMERCIAL

## 2022-04-07 DIAGNOSIS — R45.86 MOOD SWINGS: ICD-10-CM

## 2022-04-07 DIAGNOSIS — R53.82 CHRONIC FATIGUE: ICD-10-CM

## 2022-04-07 PROCEDURE — 76536 US EXAM OF HEAD AND NECK: CPT

## 2022-04-14 LAB
ALBUMIN SERPL-MCNC: 4 G/DL (ref 3.8–4.8)
ALBUMIN/GLOB SERPL: 1.5 {RATIO} (ref 1.2–2.2)
ALP SERPL-CCNC: 68 IU/L (ref 44–121)
ALT SERPL-CCNC: 9 IU/L (ref 0–32)
AST SERPL-CCNC: 16 IU/L (ref 0–40)
BASOPHILS # BLD AUTO: 0 X10E3/UL (ref 0–0.2)
BASOPHILS NFR BLD AUTO: 1 %
BILIRUB SERPL-MCNC: 0.4 MG/DL (ref 0–1.2)
BUN SERPL-MCNC: 13 MG/DL (ref 6–20)
BUN/CREAT SERPL: 18 (ref 9–23)
CALCIUM SERPL-MCNC: 9.4 MG/DL (ref 8.7–10.2)
CHLORIDE SERPL-SCNC: 104 MMOL/L (ref 96–106)
CHOLEST SERPL-MCNC: 162 MG/DL (ref 100–199)
CO2 SERPL-SCNC: 22 MMOL/L (ref 20–29)
CREAT SERPL-MCNC: 0.73 MG/DL (ref 0.57–1)
EGFR: 111 ML/MIN/1.73
EOSINOPHIL # BLD AUTO: 0.1 X10E3/UL (ref 0–0.4)
EOSINOPHIL NFR BLD AUTO: 2 %
ERYTHROCYTE [DISTWIDTH] IN BLOOD BY AUTOMATED COUNT: 12.7 % (ref 11.7–15.4)
GLOBULIN SER-MCNC: 2.6 G/DL (ref 1.5–4.5)
GLUCOSE SERPL-MCNC: 83 MG/DL (ref 65–99)
HCT VFR BLD AUTO: 35.6 % (ref 34–46.6)
HDLC SERPL-MCNC: 52 MG/DL
HGB BLD-MCNC: 11.4 G/DL (ref 11.1–15.9)
IMM GRANULOCYTES # BLD: 0 X10E3/UL (ref 0–0.1)
IMM GRANULOCYTES NFR BLD: 0 %
LDLC SERPL CALC-MCNC: 96 MG/DL (ref 0–99)
LDLC/HDLC SERPL: 1.8 RATIO (ref 0–3.2)
LYMPHOCYTES # BLD AUTO: 1.7 X10E3/UL (ref 0.7–3.1)
LYMPHOCYTES NFR BLD AUTO: 31 %
MCH RBC QN AUTO: 26.1 PG (ref 26.6–33)
MCHC RBC AUTO-ENTMCNC: 32 G/DL (ref 31.5–35.7)
MCV RBC AUTO: 82 FL (ref 79–97)
MONOCYTES # BLD AUTO: 0.4 X10E3/UL (ref 0.1–0.9)
MONOCYTES NFR BLD AUTO: 7 %
NEUTROPHILS # BLD AUTO: 3.4 X10E3/UL (ref 1.4–7)
NEUTROPHILS NFR BLD AUTO: 59 %
PLATELET # BLD AUTO: 250 X10E3/UL (ref 150–450)
POTASSIUM SERPL-SCNC: 4.1 MMOL/L (ref 3.5–5.2)
PROT SERPL-MCNC: 6.6 G/DL (ref 6–8.5)
RBC # BLD AUTO: 4.36 X10E6/UL (ref 3.77–5.28)
SL AMB VLDL CHOLESTEROL CALC: 14 MG/DL (ref 5–40)
SODIUM SERPL-SCNC: 139 MMOL/L (ref 134–144)
THYROGLOB AB SERPL-ACNC: 2.1 IU/ML (ref 0–0.9)
THYROPEROXIDASE AB SERPL-ACNC: 265 IU/ML (ref 0–34)
TRIGL SERPL-MCNC: 72 MG/DL (ref 0–149)
TSH SERPL DL<=0.005 MIU/L-ACNC: 2.25 UIU/ML (ref 0.45–4.5)
WBC # BLD AUTO: 5.7 X10E3/UL (ref 3.4–10.8)

## 2022-04-19 ENCOUNTER — TELEPHONE (OUTPATIENT)
Dept: FAMILY MEDICINE CLINIC | Facility: HOSPITAL | Age: 37
End: 2022-04-19

## 2022-05-20 ENCOUNTER — OFFICE VISIT (OUTPATIENT)
Dept: FAMILY MEDICINE CLINIC | Facility: HOSPITAL | Age: 37
End: 2022-05-20
Payer: COMMERCIAL

## 2022-05-20 VITALS
WEIGHT: 174 LBS | HEIGHT: 67 IN | SYSTOLIC BLOOD PRESSURE: 112 MMHG | OXYGEN SATURATION: 98 % | DIASTOLIC BLOOD PRESSURE: 78 MMHG | HEART RATE: 74 BPM | BODY MASS INDEX: 27.31 KG/M2

## 2022-05-20 DIAGNOSIS — M25.642 STIFFNESS OF JOINTS OF BOTH HANDS: ICD-10-CM

## 2022-05-20 DIAGNOSIS — E55.9 VITAMIN D DEFICIENCY: ICD-10-CM

## 2022-05-20 DIAGNOSIS — M25.641 STIFFNESS OF JOINTS OF BOTH HANDS: ICD-10-CM

## 2022-05-20 DIAGNOSIS — R53.1 WEAKNESS GENERALIZED: ICD-10-CM

## 2022-05-20 DIAGNOSIS — R53.82 CHRONIC FATIGUE: Primary | ICD-10-CM

## 2022-05-20 DIAGNOSIS — R76.8 THYROID ANTIBODY POSITIVE: ICD-10-CM

## 2022-05-20 PROCEDURE — 3008F BODY MASS INDEX DOCD: CPT | Performed by: INTERNAL MEDICINE

## 2022-05-20 PROCEDURE — 99214 OFFICE O/P EST MOD 30 MIN: CPT | Performed by: INTERNAL MEDICINE

## 2022-05-20 PROCEDURE — 1036F TOBACCO NON-USER: CPT | Performed by: INTERNAL MEDICINE

## 2022-05-20 NOTE — PROGRESS NOTES
Assessment/Plan:             Problem List Items Addressed This Visit        Other    Chronic fatigue - Primary    Weakness generalized      Other Visit Diagnoses     Thyroid antibody positive        Relevant Orders    TSH, 3rd generation with Free T4 reflex    Stiffness of joints of both hands        Relevant Orders    Ehrlichia antibody panel    Lyme Total Antibody Profile with reflex to WB    C-reactive protein    Antinuclear Antibodies, IFA            Subjective:      Patient ID: Deangelo Castle is a 31 y o  female    1  Tiredness-  Never feels rested- bed 11- 1130- up  640-6- 8 j hours of sleep- has to force herself through the day- does not nap during the day  2  Neck and shoulder pain- had some tingling while driving in [place] - felt weak- saw rheumatology a few years ago- feels like she does not have strength that she did in past- works outside a lot- has tick exposures- will check garrett   And rheumatoid factor- will also check for Lyme and anaplasmosis , ehrlichiosis hand stiffness is worsening- mother has arthritis- Psoriatic arthritis  3  Chest pressure -Busy with her 4 children- feels overwhelmed felt a panic attack - pressure in chest- was wondering if she had some panic attack  4 ovarian cysts- had surgery  2 years ago then had a tubal pregnanacy- now periodis are heavy and regular      The following portions of the patient's history were reviewed and updated as appropriate: allergies, current medications and problem list      Review of Systems   Constitutional: Positive for fatigue  Respiratory: Positive for chest tightness  Negative for shortness of breath  Cardiovascular: Negative for chest pain and palpitations  Musculoskeletal: Positive for arthralgias and back pain  Psychiatric/Behavioral: Negative for dysphoric mood  Has mild anxiety at times   All other systems reviewed and are negative          Objective:      Current Outpatient Medications:     Ferrous Gluconate 256 (28 Fe) MG TABS, Take 246 mg by mouth, Disp: , Rfl:     Blood pressure 112/78, pulse 74, height 5' 7" (1 702 m), weight 78 9 kg (174 lb), SpO2 98 %, not currently breastfeeding  Physical Exam  Vitals and nursing note reviewed  Constitutional:       General: She is not in acute distress  Appearance: She is not ill-appearing  HENT:      Head: Normocephalic and atraumatic  Nose: No congestion  Eyes:      General:         Right eye: No discharge  Left eye: No discharge  Cardiovascular:      Rate and Rhythm: Normal rate and regular rhythm  Heart sounds: No murmur heard  Pulmonary:      Effort: No respiratory distress  Breath sounds: No wheezing  Abdominal:      General: Abdomen is flat  There is no distension  Palpations: Abdomen is soft  Tenderness: There is no abdominal tenderness  Musculoskeletal:         General: No swelling or tenderness  Cervical back: No rigidity  Skin:     Findings: No erythema  Neurological:      General: No focal deficit present  Mental Status: She is alert and oriented to person, place, and time  Psychiatric:         Mood and Affect: Mood normal          Thought Content:  Thought content normal

## 2022-06-01 LAB — 25(OH)D3+25(OH)D2 SERPL-MCNC: 27.2 NG/ML (ref 30–100)

## 2022-06-03 DIAGNOSIS — E55.9 VITAMIN D DEFICIENCY: Primary | ICD-10-CM

## 2022-06-03 LAB
A PHAGOCYTOPH IGG TITR SER IF: NEGATIVE {TITER}
A PHAGOCYTOPH IGM TITR SER IF: NEGATIVE {TITER}
ANA TITR SER IF: NEGATIVE {TITER}
B BURGDOR IGG PATRN SER IB-IMP: NEGATIVE
B BURGDOR IGM PATRN SER IB-IMP: NEGATIVE
B BURGDOR18KD IGG SER QL IB: NORMAL
B BURGDOR23KD IGG SER QL IB: NORMAL
B BURGDOR23KD IGM SER QL IB: NORMAL
B BURGDOR28KD IGG SER QL IB: NORMAL
B BURGDOR30KD IGG SER QL IB: NORMAL
B BURGDOR39KD IGG SER QL IB: NORMAL
B BURGDOR39KD IGM SER QL IB: NORMAL
B BURGDOR41KD IGG SER QL IB: NORMAL
B BURGDOR41KD IGM SER QL IB: NORMAL
B BURGDOR45KD IGG SER QL IB: NORMAL
B BURGDOR58KD IGG SER QL IB: NORMAL
B BURGDOR66KD IGG SER QL IB: NORMAL
B BURGDOR93KD IGG SER QL IB: NORMAL
CRP SERPL-MCNC: <1 MG/L (ref 0–10)
E CHAFFEENSIS IGG TITR SER IF: NEGATIVE {TITER}
E CHAFFEENSIS IGM TITR SER IF: NEGATIVE {TITER}
TSH SERPL DL<=0.005 MIU/L-ACNC: 1.79 UIU/ML (ref 0.45–4.5)

## 2022-06-06 ENCOUNTER — OFFICE VISIT (OUTPATIENT)
Dept: ENDOCRINOLOGY | Facility: HOSPITAL | Age: 37
End: 2022-06-06
Payer: COMMERCIAL

## 2022-06-06 VITALS
SYSTOLIC BLOOD PRESSURE: 118 MMHG | DIASTOLIC BLOOD PRESSURE: 78 MMHG | WEIGHT: 171 LBS | HEART RATE: 76 BPM | HEIGHT: 67 IN | BODY MASS INDEX: 26.84 KG/M2

## 2022-06-06 DIAGNOSIS — R53.82 CHRONIC FATIGUE: ICD-10-CM

## 2022-06-06 DIAGNOSIS — N92.1 MENORRHAGIA WITH IRREGULAR CYCLE: ICD-10-CM

## 2022-06-06 DIAGNOSIS — E06.3 HASHIMOTO'S THYROIDITIS: Primary | ICD-10-CM

## 2022-06-06 DIAGNOSIS — E06.3 THYROIDITIS, AUTOIMMUNE: ICD-10-CM

## 2022-06-06 DIAGNOSIS — E04.9 GOITER: ICD-10-CM

## 2022-06-06 DIAGNOSIS — E55.9 VITAMIN D DEFICIENCY: ICD-10-CM

## 2022-06-06 PROCEDURE — 3008F BODY MASS INDEX DOCD: CPT | Performed by: INTERNAL MEDICINE

## 2022-06-06 PROCEDURE — 99244 OFF/OP CNSLTJ NEW/EST MOD 40: CPT | Performed by: INTERNAL MEDICINE

## 2022-06-06 PROCEDURE — 1036F TOBACCO NON-USER: CPT | Performed by: INTERNAL MEDICINE

## 2022-06-06 RX ORDER — MELATONIN
200 DAILY
COMMUNITY
End: 2022-06-06 | Stop reason: SDUPTHER

## 2022-06-06 RX ORDER — MELATONIN
1000 DAILY
Start: 2022-06-06

## 2022-06-06 NOTE — PROGRESS NOTES
6/6/2022    Assessment/Plan      Diagnoses and all orders for this visit:    Hashimoto's thyroiditis    Thyroiditis, autoimmune  -     Ambulatory Referral to Endocrinology    Menorrhagia with irregular cycle  -     Ambulatory Referral to Endocrinology    Chronic fatigue  -     Ambulatory Referral to Endocrinology    Goiter    Vitamin D deficiency  -     cholecalciferol (VITAMIN D3) 1,000 units tablet; Take 1 tablet (1,000 Units total) by mouth daily    Other orders  -     Discontinue: cholecalciferol (VITAMIN D3) 1,000 units tablet; Take 200 Units by mouth daily        Assessment/Plan:  1  Hashimoto's thyroiditis  She has a lot of symptoms of hypothyroidism but currently her thyroid function tests are normal   At this point, no thyroid hormone replacement as needed but I will continue to follow her thyroid levels over time  2  She has chronic fatigue with menorrhagia which may be related to the thyroid  It is sometimes possible to have hypothyroid symptoms prior to abnormalities in thyroid hormone levels in Hashimoto's patients  3  Thyroid goiter she has an abnormality that is hypervascular in her left thyroid lobe which could be a nodule and if it is, it is quite large  It would be classified as TI-RADS 4 on her current thyroid ultrasound  Given her history of possible exposure to follow from [place] as a child, biopsy of this area would be indicated if it truly is a nodule  I am going to discuss this with radiology to see what they think  She was informed she may need a thyroid biopsy  4  Vitamin-D deficiency  Despite taking vitamin-D 200 units daily, she still has vitamin-D deficiency and I have asked her to increase her vitamin-D to 1000 units daily  Follow-up and further blood work will be determined based on the conversation I had with radiology as to whether she needs a thyroid biopsy or not  CC:  Thyroid consult    History of Present Illness     HPI: Mark Villalba is a 31 y  o  year old female with history of abnormal thyroid levels in the setting of symptoms of hypothyroidism for evaluation/consult  She reports that thyroid disease runs in her family as 2 of her sisters and father have all had thyroid disease  She was feeling hypothyroid recently and went to her primary care physician who did blood work and an ultrasound  She in general does not feel well  She says she has been sleeping a lot does not feel energetic and is quite fatigued  She says her body hurts and she does not feel rested in the morning  She is always somewhat cold  Weight is 28 lb more over the last 4 years via slow gait with no change in activity or diet and trying to watch her calories  She denies tremors, palpitation, heat intolerance, or insomnia  She denies anxiety or depression  She is constipated  She has dry skin and brittle nails along with generalized hair loss  She denies diarrhea  Menstrual cycles occur regularly every month but are usually a few days early and they have a very heavy flow with a lot of cramping  She denies diplopia  She denies compressive thyroid symptoms or difficulties with swallowing  Of note, she was born 3 year after [place] occurred in bilirubin which is very close to this area  She moved to the [facility] when she was 11 and she reports that her family was taking iodine pills but the children under 5 could not  Review of Systems   Constitutional: Positive for fatigue and unexpected weight change  Weight up 28 lbs over the last 4 years, slow gain  No change in activity or dietary habits  Tries to watch calories  She does not feel well in general   She is been sleeping a lot and is not as energetic  Her body hurts and she does not feel rested in the morning  She admits she did change her diet a bit per her sister's recommendation and has cut out pasta, dairy, and coffee within the last month     HENT: Negative for hearing loss, tinnitus and trouble swallowing  No XRT to the head or neck in the past   She did live in [place] close to [place] and was born 1 year after the [place] melt down of the reactive  Eyes: Negative for visual disturbance  Uses contacts for distance  No diplopia  Respiratory: Negative for chest tightness and shortness of breath  Cardiovascular: Positive for chest pain  Negative for palpitations and leg swelling  Gets chest pain as feels stressed in her body and will have mid chest pressure  This is worse when very busy and stressed  Generally feels swollen in limbs  Gastrointestinal: Positive for constipation  Negative for abdominal pain, diarrhea and nausea  Endocrine: Positive for cold intolerance  Negative for heat intolerance  Genitourinary:        Menses mostly regular but a few days early most months  Very heavy flow though  A lot of cramping  She did see her gynecologist about 4 years ago had a sharp pain with apparently ovarian cysts that have pursed  She also had an ectopic pregnancy on the right  Musculoskeletal: Positive for arthralgias  Negative for back pain  Feels upper body pain in shoulders to the arms  Skin: Negative for rash  Has dry skin  Has brittle nails  Has hair loss  It is generalized  Neurological: Positive for dizziness and numbness  Negative for tremors, light-headedness and headaches  Will get dizzy with a lot of movement throughout the day  Psychiatric/Behavioral: Negative for dysphoric mood and sleep disturbance  The patient is not nervous/anxious          Historical Information   Past Medical History:   Diagnosis Date    Chicken pox     Ovarian cyst rupture     multiple    Patient denies medical problems      Past Surgical History:   Procedure Laterality Date    ECTOPIC PREGNANCY SURGERY Right 2020    OVARIAN CYST REMOVAL Right 2019     Social History   Social History     Substance and Sexual Activity   Alcohol Use No Social History     Substance and Sexual Activity   Drug Use No     Social History     Tobacco Use   Smoking Status Never Smoker   Smokeless Tobacco Never Used     Family History:   Family History   Problem Relation Age of Onset    Rheum arthritis Mother     Psoriasis Mother     Hypothyroidism Father     Hypothyroidism Sister     Thyroid disease Sister     Hashimoto's thyroiditis Sister     Hashimoto's thyroiditis Sister     No Known Problems Sister     No Known Problems Brother     No Known Problems Brother     No Known Problems Brother     Hypertension Family     Hyperlipidemia Family     Stroke Grandchild     No Known Problems Son     No Known Problems Son     No Known Problems Daughter     No Known Problems Daughter        Meds/Allergies   Current Outpatient Medications   Medication Sig Dispense Refill    cholecalciferol (VITAMIN D3) 1,000 units tablet Take 1 tablet (1,000 Units total) by mouth daily      Ferrous Gluconate 256 (28 Fe) MG TABS Take 246 mg by mouth       No current facility-administered medications for this visit  No Known Allergies    Objective   Vitals: Blood pressure 118/78, pulse 76, height 5' 7" (1 702 m), weight 77 6 kg (171 lb), not currently breastfeeding  Invasive Devices  Report    None                 Physical Exam  Vitals reviewed  Constitutional:       Appearance: Normal appearance  She is well-developed  HENT:      Head: Normocephalic and atraumatic  Eyes:      Extraocular Movements: Extraocular movements intact  Conjunctiva/sclera: Conjunctivae normal       Comments: No lid lag, stare, proptosis, or periorbital edema  Neck:      Thyroid: No thyromegaly  Vascular: No carotid bruit  Comments: thyroid enlarged, left greater than right  No palpable nodules  No bruits over the thyroid gland  Cardiovascular:      Rate and Rhythm: Normal rate and regular rhythm  Heart sounds: Normal heart sounds  No murmur heard    Pulmonary: Effort: Pulmonary effort is normal       Breath sounds: Normal breath sounds  No wheezing  Abdominal:      General: Bowel sounds are normal       Palpations: Abdomen is soft  Tenderness: There is no abdominal tenderness  Musculoskeletal:         General: No deformity  Normal range of motion  Cervical back: Normal range of motion and neck supple  Right lower leg: No edema  Left lower leg: No edema  Comments: No tremor of the outstretched hands  No spinous process tenderness  No CVA tenderness  Lymphadenopathy:      Cervical: No cervical adenopathy  Skin:     General: Skin is warm and dry  Findings: No rash  Neurological:      Mental Status: She is alert and oriented to person, place, and time  Deep Tendon Reflexes: Reflexes are normal and symmetric  Comments: Patellar deep tendon reflexes normal         The history was obtained from the review of the chart and from the patient  Lab Results:   Blood work done on 05/31/2022 showed a TSH of 1 79 with a 25 hydroxy vitamin-D level of 27 2  Blood work done on 04/13/2022 showed positive thyroid peroxidase antibodies at 265 and positive thyroglobulin antibodies at 2 1 with a TSH of 2 25       TSH in 2017 was 2 21  Lab Results   Component Value Date    CREATININE 0 73 04/13/2022    CREATININE 0 70 10/11/2017    BUN 13 04/13/2022     10/11/2017    K 4 1 04/13/2022     04/13/2022    CO2 22 04/13/2022     eGFR   Date Value Ref Range Status   04/13/2022 111 >59 mL/min/1 73 Final         Lab Results   Component Value Date    CHOL 155 10/11/2017    HDL 52 04/13/2022    TRIG 72 04/13/2022       Lab Results   Component Value Date    ALT 9 04/13/2022    AST 16 04/13/2022    ALKPHOS 56 10/11/2017    BILITOT 0 3 10/11/2017       Lab Results   Component Value Date    TSH 1 790 05/31/2022     Thyroid ultrasound:      THYROID ULTRASOUND performed on 04/07/2022    INDICATION: Patient experiencing chronic fatigue  COMPARISON: None   TECHNIQUE: Ultrasound of the thyroid was performed with a high frequency linear transducer in transverse and sagittal planes including volumetric imaging sweeps as well as traditional still imaging technique  FINDINGS: Thyroid parenchyma is diffusely heterogeneous in echotexture and also mildly hyperemic  Right lobe: 5 7 x 1 5 x 1 9 cm  Volume 7 5 mL   Left lobe: 6 1 x 1 6 x 1 9 cm  Volume 8 9 mL   Isthmus: 0 4 cm  Somewhat lobular heterogeneous and somewhat hypoechoic area in the anterior left thyroid lobe towards the isthmus is seen  This may simply represent heterogeneous tissue  If classified as a nodule:   Nodule #1  Image 31  Left gland measuring 2 6 x 0 7 x 2 0 cm  COMPOSITION: 2 points, solid or almost completely solid   ECHOGENICITY: 2 points, hypoechoic  SHAPE: 0 points, wider-than-tall  MARGIN: 0 points, ill-defined  ECHOGENIC FOCI: 0 points, none or large comet-tail artifacts  TI-RADS Classification: TR 4 (4-6 points), FNA if > 1 5 cm  Follow if > 1cm  No additional findings   IMPRESSION:   Mildly enlarged mildly hypervascular heterogeneous thyroid tissue  In the appropriate clinical and laboratory setting these findings may correspond to thyroiditis  Nodular region on the left, favored to be heterogeneous lobular thyroid tissue rather than true nodule   Would however recommend a repeat thyroid ultrasound examination in approximately 6 months time to reassess this area      Future Appointments   Date Time Provider Dona Stacy   9/9/2022 10:30 AM DO YOLIE Melo  [facility]

## 2022-06-06 NOTE — PATIENT INSTRUCTIONS
The thyroid blood work shows you have hashimoto's thyroiditis too  It is an autoimmune disease inherited  The thyroid blood work is not yet underactive so no thyroid medicine as yet  Continue to work on the diet, you could take gluten out of the diet too, not absolutely necessary  I will have our radiologist look at the ultrasound again  If he feels you may need a biopsy based on your exposure in the past, we call you  Follow up to be determined  Increase the vitamin D to 1000 units daily

## 2022-06-24 ENCOUNTER — TELEPHONE (OUTPATIENT)
Dept: ENDOCRINOLOGY | Facility: HOSPITAL | Age: 37
End: 2022-06-24

## 2022-06-24 DIAGNOSIS — E04.1 LEFT THYROID NODULE: Primary | ICD-10-CM

## 2022-06-24 DIAGNOSIS — Z92.3 HISTORY OF RADIATION EXPOSURE: ICD-10-CM

## 2022-06-24 DIAGNOSIS — E04.9 GOITER: ICD-10-CM

## 2022-06-24 DIAGNOSIS — E06.3 HASHIMOTO'S THYROIDITIS: ICD-10-CM

## 2022-08-10 ENCOUNTER — HOSPITAL ENCOUNTER (OUTPATIENT)
Dept: RADIOLOGY | Facility: HOSPITAL | Age: 37
Discharge: HOME | End: 2022-08-10
Payer: COMMERCIAL

## 2022-08-10 DIAGNOSIS — E04.1 LEFT THYROID NODULE: ICD-10-CM

## 2022-08-10 DIAGNOSIS — E06.3 HASHIMOTO'S THYROIDITIS: ICD-10-CM

## 2022-08-10 DIAGNOSIS — E04.9 GOITER: ICD-10-CM

## 2022-08-10 DIAGNOSIS — Z92.3 HISTORY OF RADIATION EXPOSURE: ICD-10-CM

## 2022-08-10 PROCEDURE — 88173 CYTOPATH EVAL FNA REPORT: CPT | Performed by: PATHOLOGY

## 2022-08-10 PROCEDURE — 10005 FNA BX W/US GDN 1ST LES: CPT

## 2022-08-10 RX ORDER — LIDOCAINE HYDROCHLORIDE 10 MG/ML
5 INJECTION, SOLUTION EPIDURAL; INFILTRATION; INTRACAUDAL; PERINEURAL ONCE
Status: COMPLETED | OUTPATIENT
Start: 2022-08-10 | End: 2022-08-10

## 2022-08-10 RX ADMIN — LIDOCAINE HYDROCHLORIDE 2 ML: 10 INJECTION, SOLUTION EPIDURAL; INFILTRATION; INTRACAUDAL; PERINEURAL at 09:40

## 2022-08-15 DIAGNOSIS — E55.9 VITAMIN D DEFICIENCY: ICD-10-CM

## 2022-08-15 DIAGNOSIS — E06.3 HASHIMOTO'S THYROIDITIS: Primary | ICD-10-CM

## 2022-09-09 ENCOUNTER — OFFICE VISIT (OUTPATIENT)
Dept: FAMILY MEDICINE CLINIC | Facility: HOSPITAL | Age: 37
End: 2022-09-09
Payer: COMMERCIAL

## 2022-09-09 ENCOUNTER — HOSPITAL ENCOUNTER (OUTPATIENT)
Dept: RADIOLOGY | Facility: HOSPITAL | Age: 37
Discharge: HOME | End: 2022-09-09
Attending: INTERNAL MEDICINE
Payer: COMMERCIAL

## 2022-09-09 VITALS
SYSTOLIC BLOOD PRESSURE: 112 MMHG | HEIGHT: 67 IN | WEIGHT: 172 LBS | DIASTOLIC BLOOD PRESSURE: 72 MMHG | OXYGEN SATURATION: 98 % | BODY MASS INDEX: 27 KG/M2 | HEART RATE: 76 BPM

## 2022-09-09 DIAGNOSIS — E06.3 HASHIMOTO'S THYROIDITIS: Primary | ICD-10-CM

## 2022-09-09 DIAGNOSIS — M79.642 PAIN IN BOTH HANDS: ICD-10-CM

## 2022-09-09 DIAGNOSIS — E06.3 THYROIDITIS, AUTOIMMUNE: ICD-10-CM

## 2022-09-09 DIAGNOSIS — M25.511 PAIN OF BOTH SHOULDER JOINTS: ICD-10-CM

## 2022-09-09 DIAGNOSIS — N92.0 MENORRHAGIA WITH REGULAR CYCLE: ICD-10-CM

## 2022-09-09 DIAGNOSIS — M79.641 PAIN IN BOTH HANDS: ICD-10-CM

## 2022-09-09 DIAGNOSIS — M25.512 PAIN OF BOTH SHOULDER JOINTS: ICD-10-CM

## 2022-09-09 DIAGNOSIS — E04.1 LEFT THYROID NODULE: ICD-10-CM

## 2022-09-09 DIAGNOSIS — R53.82 CHRONIC FATIGUE: ICD-10-CM

## 2022-09-09 DIAGNOSIS — E66.3 OVERWEIGHT WITH BODY MASS INDEX (BMI) OF 26 TO 26.9 IN ADULT: ICD-10-CM

## 2022-09-09 PROCEDURE — 73130 X-RAY EXAM OF HAND: CPT

## 2022-09-09 PROCEDURE — 99214 OFFICE O/P EST MOD 30 MIN: CPT | Performed by: INTERNAL MEDICINE

## 2022-09-09 NOTE — PROGRESS NOTES
Assessment/Plan:             Problem List Items Addressed This Visit        Endocrine    Hashimoto's thyroiditis - Primary     To have repeat labs with endocrinology         Left thyroid nodule     Had bx done this year and no signs of malignancy will be followed by endocrinology team            Other    Menorrhagia with regular cycle     Has  regular cycle now-28- 19 days - has  5 days of bleeding   taking iron         Relevant Orders    CBC and differential    Iron Panel (Includes Ferritin, Iron Sat%, Iron, and TIBC)    Chronic fatigue    Relevant Orders    CBC and differential    Iron Panel (Includes Ferritin, Iron Sat%, Iron, and TIBC)      Other Visit Diagnoses     Thyroiditis, autoimmune        Overweight with body mass index (BMI) of 26 to 26 9 in adult        Relevant Orders    CBC and differential    Iron Panel (Includes Ferritin, Iron Sat%, Iron, and TIBC)    Pain of both shoulder joints        Relevant Orders    C-reactive protein    Anti-DNA antibody, double-stranded    Ambulatory Referral to Rheumatology    Pain in both hands        Relevant Orders    XR hand 3+ vw left    XR hand 3+ vw left            Subjective:      Patient ID: Britton Wang is a 30 y o  female    1  Stiffness in am- takes about 2 hours to feel better movement- in shoulders and hands and back of kness and ankles with stiff ness also  now has some outbreak of rash on side of face and foehead in past month        The following portions of the patient's history were reviewed and updated as appropriate: allergies, current medications and problem list      Review of Systems      Objective:      Current Outpatient Medications:     cholecalciferol (VITAMIN D3) 1,000 units tablet, Take 1 tablet (1,000 Units total) by mouth daily, Disp: , Rfl:     Ferrous Gluconate 256 (28 Fe) MG TABS, Take 246 mg by mouth, Disp: , Rfl:     Blood pressure 112/72, pulse 76, height 5' 7" (1 702 m), weight 78 kg (172 lb), SpO2 98 %, not currently breastfeeding  Physical Exam  Vitals and nursing note reviewed  Constitutional:       Appearance: Normal appearance  She is not ill-appearing  HENT:      Head: Normocephalic  Right Ear: There is no impacted cerumen  Left Ear: There is no impacted cerumen  Nose: No congestion  Eyes:      General:         Right eye: No discharge  Left eye: No discharge  Cardiovascular:      Rate and Rhythm: Normal rate and regular rhythm  Heart sounds: No murmur heard  Pulmonary:      Breath sounds: No wheezing or rhonchi  Abdominal:      General: There is no distension  Palpations: Abdomen is soft  Musculoskeletal:         General: Tenderness present  Cervical back: No rigidity  Right lower leg: No edema  Left lower leg: No edema  Comments: Fullness in wrists, posterior knee fullness   Neurological:      General: No focal deficit present  Mental Status: She is alert  Psychiatric:         Mood and Affect: Mood normal          Thought Content:  Thought content normal          Judgment: Judgment normal

## 2022-09-12 ENCOUNTER — TELEPHONE (OUTPATIENT)
Dept: FAMILY MEDICINE CLINIC | Facility: HOSPITAL | Age: 37
End: 2022-09-12

## 2022-09-12 ENCOUNTER — TELEPHONE (OUTPATIENT)
Dept: DERMATOLOGY | Facility: CLINIC | Age: 37
End: 2022-09-12

## 2022-09-12 DIAGNOSIS — B07.9 VERRUCA VULGARIS: Primary | ICD-10-CM

## 2022-09-12 NOTE — TELEPHONE ENCOUNTER
Pt called to schedule NP appt for a wart on her hand  Advised to call PCP for referral to dermatology and call back to schedule

## 2022-10-26 ENCOUNTER — CONSULT (OUTPATIENT)
Dept: DERMATOLOGY | Facility: CLINIC | Age: 37
End: 2022-10-26

## 2022-10-26 VITALS — BODY MASS INDEX: 26.68 KG/M2 | WEIGHT: 170 LBS | HEIGHT: 67 IN

## 2022-10-26 DIAGNOSIS — D48.5 NEOPLASM OF UNCERTAIN BEHAVIOR OF SKIN: Primary | ICD-10-CM

## 2022-10-26 DIAGNOSIS — B07.9 VERRUCA VULGARIS: ICD-10-CM

## 2022-10-26 RX ORDER — CANDIDA ALBICANS 1000 [PNU]/ML
0.1 INJECTION, SOLUTION INTRADERMAL ONCE
Status: COMPLETED | OUTPATIENT
Start: 2022-10-26 | End: 2022-10-26

## 2022-10-26 RX ADMIN — CANDIDA ALBICANS 0.1 ML: 1000 INJECTION, SOLUTION INTRADERMAL at 11:38

## 2022-10-26 NOTE — PATIENT INSTRUCTIONS
1  VERRUCA VULGARIS ("Common Warts")    Assessment and Plan:  Based on a thorough discussion of this condition and the management approach to it (including a comprehensive discussion of the known risks, side effects and potential benefits of treatment), the patient (family) agrees to implement the following specific plan:  Discussed treatment options such as cryotherapy, Imiquimod cream, or candida injection  Injected with Candida today in office with signed consent form   Follow up in 1 month   Please continue home treatments between treatments     Verruca Vulgaris  A verruca is a common growth of the skin caused by infection by human papilloma virus (HPV)  There are many strains of the virus that cause different types of warts on the body  The virus infects the most superficial layers of the skin, causing increased production of skin cells and thickening  Warts can be spread through direct contact with infected skin and may spread to other parts of the body if scratched or picked  A verruca is more commonly called a "wart " Warts are particularly common in school-aged children but can arise at any age  Patients who have a history of eczema are especially prone due to impaired skin barrier  Those taking immunosuppressive drugs or with HIV infections may experience prolonged symptoms despite treatment  Warts generally have a rough surface with a tiny black dot sometimes observed in the middle of each scaly spot  They can range in size from a small bump to large scaly growths  Common warts are often found on the backs of fingers or toes, around the nails, and on the knees  Plantar warts can grow inwardly on the soles of the feet causing pain  There are many possible ways to treat warts and sometimes several different treatments are needed to get the warts to go away completely  There is no single perfect treatment for warts, and successful treatment can take many months       In-office treatments usually require multiple visits, and include:  Cryotherapy  a cold spray with liquid nitrogen will destroy the infected cells but may lead to discomfort and blistering  It may also leave a permanent white porfirio or scar  Electrosurgery (curettage and cautery) can be used for large resistant warts which involves shaving the growth down and burning the base  It is performed under local anesthesia and may leave a permanent scar    Candida (“yeast”) antigen injections  These are extracts of the common yeast (Candida) that cannot cause an infection  The medication is injected into/under the wart  It is thought to stimulate the immune system to recognize the wart virus and attack it  Multiple injections are often needed about one month apart  There are also several at-home wart treatments:    Soak the warts in warm water for 5 minutes every night followed by gentle filing with a nail file or pumice stone  Topical salicylic acid or similar compounds work by removing the dead surface skin cells  Apply the medicine directly to the wart, wait for it to dry completely, then cover with duct tape overnight   Repeat until the wart is gone, which can take 2-4 months  Do not use on the face or groin area   If the wart paint makes the skin sore, stop treatment until the discomfort has settled, then recommence as above  Take care to keep the chemical off normal skin  Podophyllin is a cytotoxic agent used in some products and must not be used in pregnancy or women considering pregnancy  Some prescription medications include   Topical retinoids (adapalene, tretinoin, tazarotene), 5-fluorouracil (Efudex) or imiquimod (Aldara) creams are sometimes used to treat flat warts or warts on the face and other sensitive anatomical areas  They are usually applied directly to the warts once a day for 2-4 months and can be irritating  These treatments should only be used as directed by your health care provider    Systemic treatment with oral cimetidine (Tagamet) may help boost the immune system against the wart virus in patients, some of the time  Initiation of cimetidine therapy should ONLY be done under the supervision of your health care provider, who can discuss possible side effects and drug-to-drug interactions of this specific treatment  PROCEDURE:  INTRALESIONAL CLINT ANTIGEN    Purpose: Candida antigen is used "off label" as an immunotherapy agent in the treatment of warts  This is widely used technique endorsed by many pediatric dermatologists because of its utility in treating multiple lesions with minimal pain and discomfort and resulting sequelae to the treated areas  Indications: It is used "off label" for the treatment of warts  Potential Side Effects: The patient signifies understanding that Candida antigen injection can potentially cause early and/or delayed adverse effects such as:    Pain    Local immune response    Bleeding    Skin discoloration   Swelling    Flu-like illness with increased lymphnodes   Serious allergic reaction (anaphylaxis)      2  NEOPLASM OF UNCERTAIN BEHAVIOR OF SKIN    Assessment and Plan:  I have discussed with the patient that a sample of skin via a "skin biopsy” would be potentially helpful to further make a specific diagnosis under the microscope  Based on a thorough discussion of this condition and the management approach to it (including a comprehensive discussion of the known risks, side effects and potential benefits of treatment), the patient (family) agrees to implement the following specific plan:    Procedure:  Skin Biopsy  After a thorough discussion of treatment options and risk/benefits/alternatives (including but not limited to local pain, scarring, dyspigmentation, blistering, possible superinfection, and inability to confirm a diagnosis via histopathology), verbal and written consent were obtained and portion of the rash was biopsied for tissue sample    See below for consent that was obtained from patient and subsequent Procedure Note  PROCEDURE TANGENTIAL (SHAVE) BIOPSY NOTE:      INFORMED CONSENT DISCUSSION AND POST-OPERATIVE INSTRUCTIONS FOR PATIENT    I   RATIONALE FOR PROCEDURE  I understand that a skin biopsy allows the Dermatologist to test a lesion or rash under the microscope to obtain a diagnosis  It usually involves numbing the area with numbing medication and removing a small piece of skin; sometimes the area will be closed with sutures  In this specific procedure, sutures are not usually needed  If any sutures are placed, then they are usually need to be removed in 2 weeks or less  I understand that my Dermatologist recommends that a skin "shave" biopsy be performed today  A local anesthetic, similar to the kind that a dentist uses when filling a cavity, will be injected with a very small needle into the skin area to be sampled  The injected skin and tissue underneath "will go to sleep” and become numb so no pain should be felt afterwards  An instrument shaped like a tiny "razor blade" (shave biopsy instrument) will be used to cut a small piece of tissue and skin from the area so that a sample of tissue can be taken and examined more closely under the microscope  A slight amount of bleeding will occur, but it will be stopped with direct pressure and a pressure bandage and any other appropriate methods  I understands that a scar will form where the wound was created  Surgical ointment will be applied to help protect the wound  Sutures are not usually needed      II   RISKS AND POTENTIAL COMPLICATIONS   I understand the risks and potential complications of a skin biopsy include but are not limited to the following:  Bleeding  Infection  Pain  Scar/keloid  Skin discoloration  Incomplete Removal  Recurrence  Nerve Damage/Numbness/Loss of Function  Allergic Reaction to Anesthesia  Biopsies are diagnostic procedures and based on findings additional treatment or evaluation may be required  Loss or destruction of specimen resulting in no additional findings    My Dermatologist has explained to me the nature of the condition, the nature of the procedure, and the benefits to be reasonably expected compared with alternative approaches  My Dermatologist has discussed the likelihood of major risks or complications of this procedure including the specific risks listed above, such as bleeding, infection, and scarring/keloid  I understand that a scar is expected after this procedure  I understand that my physician cannot predict if the scar will form a "keloid," which extends beyond the borders of the wound that is created  A keloid is a thick, painful, and bumpy scar  A keloid can be difficult to treat, as it does not always respond well to therapy, which includes injecting cortisone directly into the keloid every few weeks  While this usually reduces the pain and size of the scar, it does not eliminate it  I understand that photographs may be taken before and after the procedure  These will be maintained as part of the medical providers confidential records and may not be made available to me  I further authorize the medical provider to use the photographs for teaching purposes or to illustrate scientific papers, books, or lectures if in his/her judgment, medical research, education, or science may benefit from its use  I have had an opportunity to fully inquire about the risks and benefits of this procedure and its alternatives  I have been given ample time and opportunity to ask questions and to seek a second opinion if I wished to do so  I acknowledge that there have specifically been no guarantees as to the cosmetic results from the procedure  I am aware that with any procedure there is always the possibility of an unexpected complication  III   POST-PROCEDURAL CARE (WHAT YOU WILL NEED TO DO "AFTER THE BIOPSY" TO OPTIMIZE HEALING)    Keep the area clean and dry  Try NOT to remove the bandage or get it wet for the first 24 hours  Gently clean the area and apply surgical ointment (such as Vaseline petrolatum ointment, which is available "over the counter" and not a prescription) to the biopsy site for up to 2 weeks straight  This acts to protect the wound from the outside world  Sutures are not usually placed in this procedure  If any sutures were placed, return for suture removal as instructed (generally 1 week for the face, 2 weeks for the body)  Take Acetaminophen (Tylenol) for discomfort, if no contraindications  Ibuprofen or aspirin could make bleeding worse  Call our office immediately for signs of infection: fever, chills, increased redness, warmth, tenderness, discomfort/pain, or pus or foul smell coming from the wound  WHAT TO DO IF THERE IS ANY BLEEDING? If a small amount of bleeding is noticed, place a clean cloth over the area and apply firm pressure for ten minutes  Check the wound after 10 minutes of direct pressure  If bleeding persists, try one more time for an additional 10 minutes of direct pressure on the area  If the bleeding becomes heavier or does not stop after the second attempt, or if you have any other questions about this procedure, then please call your [facility]'s Dermatologist by calling [phone] (SKIN)  I hereby acknowledge that I have reviewed and verified the site with my Dermatologist and have requested and authorized my Dermatologist to proceed with the procedure

## 2022-10-26 NOTE — PROGRESS NOTES
[facility]     Patient Name: Paddy Villanueva  Encounter Date: 10/26/2022     Have you been cared for by a [place] [id number] Dermatologist in the last 3 years and, if so, which description applies to you? NO  I am considered a "new" patient and must complete all patient intake questions  I am FEMALE/of child-bearing potential     REVIEW OF SYSTEMS:  Have you recently had or currently have any of the following? · Recent fever or chills? No  · Any non-healing wound? No  · Are you pregnant or planning to become pregnant? No  · Are you currently or planning to be nursing or breast feeding? No   PAST MEDICAL HISTORY:  Have you personally ever had or currently have any of the following? If "YES," then please provide more detail  · Skin cancer (such as Melanoma, Basal Cell Carcinoma, Squamous Cell Carcinoma? No  · Tuberculosis, HIV/AIDS, Hepatitis B or C: No  · Systemic Immunosuppression such as Diabetes, Biologic or Immunotherapy, Chemotherapy, Organ Transplantation, Bone Marrow Transplantation No  · Radiation Treatment No   FAMILY HISTORY:  Any "first degree relatives" (parent, brother, sister, or child) with the following? • Skin Cancer, Pancreatic or Other Cancer? No   PATIENT EXPERIENCE:    • Do you want the Dermatologist to perform a COMPLETE skin exam today including a clinical examination under the "bra and underwear" areas? NO  • If necessary, do we have your permission to call and leave a detailed message on your Preferred Phone number that includes your specific medical information?   Yes      No Known Allergies   Current Outpatient Medications:   •  cholecalciferol (VITAMIN D3) 1,000 units tablet, Take 1 tablet (1,000 Units total) by mouth daily, Disp: , Rfl:   •  Ferrous Gluconate 256 (28 Fe) MG TABS, Take 246 mg by mouth, Disp: , Rfl:           • Whom besides the patient is providing clinical information about today's encounter?   o NO ADDITIONAL HISTORIAN (patient alone provided history)    Physical Exam and Assessment/Plan by Diagnosis:      1  VERRUCA VULGARIS ("Common Warts")    Physical Exam:  • Anatomic Location Affected:  Right 3rd digit   • Morphological Description:  Verrucous papule   • Pertinent Positives:  • Pertinent Negatives: Additional History of Present Condition:  Located on right third finger  Presents for 2 years  Patient has tried compound W, duct tape, and the wart band aides  Assessment and Plan:  Based on a thorough discussion of this condition and the management approach to it (including a comprehensive discussion of the known risks, side effects and potential benefits of treatment), the patient (family) agrees to implement the following specific plan:  • Discussed treatment options such as cryotherapy, Imiquimod cream, or candida injection  • Injected with Candida today in office with signed consent form   • Follow up in 1 month   • Please continue home treatments between treatments     Verruca Vulgaris  A verruca is a common growth of the skin caused by infection by human papilloma virus (HPV)  There are many strains of the virus that cause different types of warts on the body  The virus infects the most superficial layers of the skin, causing increased production of skin cells and thickening  Warts can be spread through direct contact with infected skin and may spread to other parts of the body if scratched or picked  A verruca is more commonly called a "wart " Warts are particularly common in school-aged children but can arise at any age  Patients who have a history of eczema are especially prone due to impaired skin barrier  Those taking immunosuppressive drugs or with HIV infections may experience prolonged symptoms despite treatment  Warts generally have a rough surface with a tiny black dot sometimes observed in the middle of each scaly spot  They can range in size from a small bump to large scaly growths    Common warts are often found on the backs of fingers or toes, around the nails, and on the knees  Plantar warts can grow inwardly on the soles of the feet causing pain  There are many possible ways to treat warts and sometimes several different treatments are needed to get the warts to go away completely  There is no single perfect treatment for warts, and successful treatment can take many months  In-office treatments usually require multiple visits, and include:  1) Cryotherapy  a cold spray with liquid nitrogen will destroy the infected cells but may lead to discomfort and blistering  It may also leave a permanent white porfirio or scar  2) Electrosurgery (curettage and cautery) can be used for large resistant warts which involves shaving the growth down and burning the base  It is performed under local anesthesia and may leave a permanent scar    3) Candida (“yeast”) antigen injections  These are extracts of the common yeast (Candida) that cannot cause an infection  The medication is injected into/under the wart  It is thought to stimulate the immune system to recognize the wart virus and attack it  Multiple injections are often needed about one month apart  There are also several at-home wart treatments:    1) Soak the warts in warm water for 5 minutes every night followed by gentle filing with a nail file or pumice stone  2) Topical salicylic acid or similar compounds work by removing the dead surface skin cells  a  Apply the medicine directly to the wart, wait for it to dry completely, then cover with duct tape overnight   b  Repeat until the wart is gone, which can take 2-4 months  c  Do not use on the face or groin area   d  If the wart paint makes the skin sore, stop treatment until the discomfort has settled, then recommence as above   e  Take care to keep the chemical off normal skin  3) Podophyllin is a cytotoxic agent used in some products and must not be used in pregnancy or women considering pregnancy      4) Some prescription medications include   a  Topical retinoids (adapalene, tretinoin, tazarotene), 5-fluorouracil (Efudex) or imiquimod (Aldara) creams are sometimes used to treat flat warts or warts on the face and other sensitive anatomical areas  They are usually applied directly to the warts once a day for 2-4 months and can be irritating  These treatments should only be used as directed by your health care provider  b  Systemic treatment with oral cimetidine (Tagamet) may help boost the immune system against the wart virus in patients, some of the time  Initiation of cimetidine therapy should ONLY be done under the supervision of your health care provider, who can discuss possible side effects and drug-to-drug interactions of this specific treatment  PROCEDURE:  INTRALESIONAL CLINT ANTIGEN    Purpose: Candida antigen is used "off label" as an immunotherapy agent in the treatment of warts  This is widely used technique endorsed by many pediatric dermatologists because of its utility in treating multiple lesions with minimal pain and discomfort and resulting sequelae to the treated areas  Indications: It is used "off label" for the treatment of warts  Potential Side Effects: The patient signifies understanding that Candida antigen injection can potentially cause early and/or delayed adverse effects such as:   • Pain   • Local immune response   • Bleeding   • Skin discoloration  • Swelling   • Flu-like illness with increased lymphnodes  • Serious allergic reaction (anaphylaxis)    After verbal and written consent were obtained, the to-be-treated area was wiped and cleaned with rubbing alcohol 70%  Then, a total of 0 1 mL of refrigerated Candida antigen (Lot# ; Expiration June 16 2024, NDC#: 23649-170-92) was injected intralesionally into a total of 1 lesion/s on the following anatomic areas:  Right third finger  using a 1-mL tuberculin syringe and a 30-gauge needle        There was less than 1 mL of blood loss and little to no discomfort  The area was bandaged with a Band-aid  The patient tolerated the procedure well and remained in the office for observation  With no signs of an adverse reaction, the patient was eventually discharged from clinic  2  NEOPLASM OF UNCERTAIN BEHAVIOR OF SKIN    Physical Exam:  • (Anatomic Location); (Size and Morphological Description); (Differential Diagnosis):  o Left shoulder; 0 3 cm; reynaldo papule; differential dermal nevus irritated vs other   • Pertinent Positives:  • Pertinent Negatives: Additional History of Present Condition:  N/A    Assessment and Plan:  • I have discussed with the patient that a sample of skin via a "skin biopsy” would be potentially helpful to further make a specific diagnosis under the microscope  • Based on a thorough discussion of this condition and the management approach to it (including a comprehensive discussion of the known risks, side effects and potential benefits of treatment), the patient (family) agrees to implement the following specific plan:    o Procedure:  Skin Biopsy  After a thorough discussion of treatment options and risk/benefits/alternatives (including but not limited to local pain, scarring, dyspigmentation, blistering, possible superinfection, and inability to confirm a diagnosis via histopathology), verbal and written consent were obtained and portion of the rash was biopsied for tissue sample  See below for consent that was obtained from patient and subsequent Procedure Note        PROCEDURE TANGENTIAL (SHAVE) BIOPSY NOTE:    • Performing Physician: Dr Ling  • Anatomic Location; Clinical Description with size (cm); Pre-Op Diagnosis:     Left shoulder; 0 3 cm; reynaldo papule; differential dermal nevus irritated vs other   • Post-op diagnosis: Same     • Local anesthesia: 1% Lidocaine HCL     • Topical anesthesia: None    • Hemostasis: Aluminum chloride       After obtaining informed consent  at which time there was a discussion about the purpose of biopsy  and low risks of infection and bleeding  The area was prepped and draped in the usual fashion  Anesthesia was obtained with 1% lidocaine with epinephrine  A shave biopsy to an appropriate sampling depth was obtained by Shave (Dermablade or 15 blade) The resulting wound was covered with surgical ointment and bandaged appropriately  The patient tolerated the procedure well without complications and was without signs of functional compromise  Specimen has been sent for review by Dermatopathology  Standard post-procedure care has been explained and has been included in written form within the patient's copy of Informed Consent  INFORMED CONSENT DISCUSSION AND POST-OPERATIVE INSTRUCTIONS FOR PATIENT    I   RATIONALE FOR PROCEDURE  I understand that a skin biopsy allows the Dermatologist to test a lesion or rash under the microscope to obtain a diagnosis  It usually involves numbing the area with numbing medication and removing a small piece of skin; sometimes the area will be closed with sutures  In this specific procedure, sutures are not usually needed  If any sutures are placed, then they are usually need to be removed in 2 weeks or less  I understand that my Dermatologist recommends that a skin "shave" biopsy be performed today  A local anesthetic, similar to the kind that a dentist uses when filling a cavity, will be injected with a very small needle into the skin area to be sampled  The injected skin and tissue underneath "will go to sleep” and become numb so no pain should be felt afterwards  An instrument shaped like a tiny "razor blade" (shave biopsy instrument) will be used to cut a small piece of tissue and skin from the area so that a sample of tissue can be taken and examined more closely under the microscope    A slight amount of bleeding will occur, but it will be stopped with direct pressure and a pressure bandage and any other appropriate methods  I understands that a scar will form where the wound was created  Surgical ointment will be applied to help protect the wound  Sutures are not usually needed  II   RISKS AND POTENTIAL COMPLICATIONS   I understand the risks and potential complications of a skin biopsy include but are not limited to the following:  • Bleeding  • Infection  • Pain  • Scar/keloid  • Skin discoloration  • Incomplete Removal  • Recurrence  • Nerve Damage/Numbness/Loss of Function  • Allergic Reaction to Anesthesia  • Biopsies are diagnostic procedures and based on findings additional treatment or evaluation may be required  • Loss or destruction of specimen resulting in no additional findings    My Dermatologist has explained to me the nature of the condition, the nature of the procedure, and the benefits to be reasonably expected compared with alternative approaches  My Dermatologist has discussed the likelihood of major risks or complications of this procedure including the specific risks listed above, such as bleeding, infection, and scarring/keloid  I understand that a scar is expected after this procedure  I understand that my physician cannot predict if the scar will form a "keloid," which extends beyond the borders of the wound that is created  A keloid is a thick, painful, and bumpy scar  A keloid can be difficult to treat, as it does not always respond well to therapy, which includes injecting cortisone directly into the keloid every few weeks  While this usually reduces the pain and size of the scar, it does not eliminate it  I understand that photographs may be taken before and after the procedure  These will be maintained as part of the medical providers confidential records and may not be made available to me    I further authorize the medical provider to use the photographs for teaching purposes or to illustrate scientific papers, books, or lectures if in his/her judgment, medical research, education, or science may benefit from its use  I have had an opportunity to fully inquire about the risks and benefits of this procedure and its alternatives  I have been given ample time and opportunity to ask questions and to seek a second opinion if I wished to do so  I acknowledge that there have specifically been no guarantees as to the cosmetic results from the procedure  I am aware that with any procedure there is always the possibility of an unexpected complication  III  POST-PROCEDURAL CARE (WHAT YOU WILL NEED TO DO "AFTER THE BIOPSY" TO OPTIMIZE HEALING)    • Keep the area clean and dry  Try NOT to remove the bandage or get it wet for the first 24 hours  • Gently clean the area and apply surgical ointment (such as Vaseline petrolatum ointment, which is available "over the counter" and not a prescription) to the biopsy site for up to 2 weeks straight  This acts to protect the wound from the outside world  • Sutures are not usually placed in this procedure  If any sutures were placed, return for suture removal as instructed (generally 1 week for the face, 2 weeks for the body)  • Take Acetaminophen (Tylenol) for discomfort, if no contraindications  Ibuprofen or aspirin could make bleeding worse  • Call our office immediately for signs of infection: fever, chills, increased redness, warmth, tenderness, discomfort/pain, or pus or foul smell coming from the wound  WHAT TO DO IF THERE IS ANY BLEEDING? If a small amount of bleeding is noticed, place a clean cloth over the area and apply firm pressure for ten minutes  Check the wound after 10 minutes of direct pressure  If bleeding persists, try one more time for an additional 10 minutes of direct pressure on the area    If the bleeding becomes heavier or does not stop after the second attempt, or if you have any other questions about this procedure, then please call your [facility]atologist by calling [phone] (SKIN)  I hereby acknowledge that I have reviewed and verified the site with my Dermatologist and have requested and authorized my Dermatologist to proceed with the procedure            Scribe Attestation    I,:  Johnathan Caldera am acting as a scribe while in the presence of the attending physician :       I,:  Chintan Cho MD personally performed the services described in this documentation    as scribed in my presence :

## 2023-02-07 LAB
25(OH)D3+25(OH)D2 SERPL-MCNC: 26.4 NG/ML (ref 30–100)
ALBUMIN SERPL-MCNC: 4 G/DL (ref 3.8–4.8)
ALBUMIN/GLOB SERPL: 1.9 {RATIO} (ref 1.2–2.2)
ALP SERPL-CCNC: 57 IU/L (ref 44–121)
ALT SERPL-CCNC: 7 IU/L (ref 0–32)
AST SERPL-CCNC: 16 IU/L (ref 0–40)
BASOPHILS # BLD AUTO: 0 X10E3/UL (ref 0–0.2)
BASOPHILS NFR BLD AUTO: 1 %
BILIRUB SERPL-MCNC: 0.4 MG/DL (ref 0–1.2)
BUN SERPL-MCNC: 15 MG/DL (ref 6–20)
BUN/CREAT SERPL: 20 (ref 9–23)
CALCIUM SERPL-MCNC: 9.1 MG/DL (ref 8.7–10.2)
CHLORIDE SERPL-SCNC: 106 MMOL/L (ref 96–106)
CO2 SERPL-SCNC: 21 MMOL/L (ref 20–29)
CREAT SERPL-MCNC: 0.76 MG/DL (ref 0.57–1)
CRP SERPL-MCNC: <1 MG/L (ref 0–10)
DSDNA AB SER-ACNC: 3 IU/ML (ref 0–9)
EGFR: 105 ML/MIN/1.73
EOSINOPHIL # BLD AUTO: 0.1 X10E3/UL (ref 0–0.4)
EOSINOPHIL NFR BLD AUTO: 1 %
ERYTHROCYTE [DISTWIDTH] IN BLOOD BY AUTOMATED COUNT: 12.9 % (ref 11.7–15.4)
FERRITIN SERPL-MCNC: 11 NG/ML (ref 15–150)
GLOBULIN SER-MCNC: 2.1 G/DL (ref 1.5–4.5)
GLUCOSE SERPL-MCNC: 76 MG/DL (ref 70–99)
HCT VFR BLD AUTO: 35.1 % (ref 34–46.6)
HGB BLD-MCNC: 11.4 G/DL (ref 11.1–15.9)
IMM GRANULOCYTES # BLD: 0 X10E3/UL (ref 0–0.1)
IMM GRANULOCYTES NFR BLD: 0 %
IRON SATN MFR SERPL: 36 % (ref 15–55)
IRON SERPL-MCNC: 132 UG/DL (ref 27–159)
LYMPHOCYTES # BLD AUTO: 1.5 X10E3/UL (ref 0.7–3.1)
LYMPHOCYTES NFR BLD AUTO: 29 %
MCH RBC QN AUTO: 26.4 PG (ref 26.6–33)
MCHC RBC AUTO-ENTMCNC: 32.5 G/DL (ref 31.5–35.7)
MCV RBC AUTO: 81 FL (ref 79–97)
MONOCYTES # BLD AUTO: 0.4 X10E3/UL (ref 0.1–0.9)
MONOCYTES NFR BLD AUTO: 7 %
NEUTROPHILS # BLD AUTO: 3 X10E3/UL (ref 1.4–7)
NEUTROPHILS NFR BLD AUTO: 62 %
PLATELET # BLD AUTO: 206 X10E3/UL (ref 150–450)
POTASSIUM SERPL-SCNC: 4.1 MMOL/L (ref 3.5–5.2)
PROT SERPL-MCNC: 6.1 G/DL (ref 6–8.5)
RBC # BLD AUTO: 4.32 X10E6/UL (ref 3.77–5.28)
SODIUM SERPL-SCNC: 138 MMOL/L (ref 134–144)
T4 FREE SERPL-MCNC: 1.17 NG/DL (ref 0.82–1.77)
TIBC SERPL-MCNC: 363 UG/DL (ref 250–450)
TSH SERPL DL<=0.005 MIU/L-ACNC: 2.23 UIU/ML (ref 0.45–4.5)
UIBC SERPL-MCNC: 231 UG/DL (ref 131–425)
WBC # BLD AUTO: 4.9 X10E3/UL (ref 3.4–10.8)

## 2023-02-08 DIAGNOSIS — R79.0 LOW FERRITIN LEVEL: Primary | ICD-10-CM

## 2025-01-31 ENCOUNTER — APPOINTMENT (EMERGENCY)
Dept: ULTRASOUND IMAGING | Facility: HOSPITAL | Age: 40
End: 2025-01-31
Payer: COMMERCIAL

## 2025-01-31 ENCOUNTER — HOSPITAL ENCOUNTER (EMERGENCY)
Facility: HOSPITAL | Age: 40
Discharge: HOME | End: 2025-02-01
Attending: EMERGENCY MEDICINE
Payer: COMMERCIAL

## 2025-01-31 DIAGNOSIS — Z34.91: ICD-10-CM
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
## 2025-01-31 DIAGNOSIS — O41.8X90 SUBCHORIONIC HEMATOMA: ICD-10-CM

## 2025-01-31 DIAGNOSIS — O46.8X9 SUBCHORIONIC HEMATOMA: ICD-10-CM

## 2025-01-31 LAB
ABO GROUP BLD: NORMAL
ALBUMIN SERPL BCG-MCNC: 4 G/DL (ref 3.5–5)
ALP SERPL-CCNC: 59 U/L (ref 34–104)
ALT SERPL W P-5'-P-CCNC: 12 U/L (ref 7–52)
ANION GAP SERPL CALCULATED.3IONS-SCNC: 6 MMOL/L (ref 4–13)
AST SERPL W P-5'-P-CCNC: 15 U/L (ref 13–39)
B-HCG SERPL-ACNC: ABNORMAL MIU/ML (ref 0–5)
BASOPHILS # BLD AUTO: 0.04 THOUSANDS/ΜL (ref 0–0.1)
BASOPHILS NFR BLD AUTO: 0 % (ref 0–1)
BILIRUB SERPL-MCNC: 0.28 MG/DL (ref 0.2–1)
BLD GP AB SCN SERPL QL: NEGATIVE
BUN SERPL-MCNC: 10 MG/DL (ref 5–25)
CALCIUM SERPL-MCNC: 9.1 MG/DL (ref 8.4–10.2)
CHLORIDE SERPL-SCNC: 106 MMOL/L (ref 96–108)
CO2 SERPL-SCNC: 21 MMOL/L (ref 21–32)
CREAT SERPL-MCNC: 0.52 MG/DL (ref 0.6–1.3)
EOSINOPHIL # BLD AUTO: 0.03 THOUSAND/ΜL (ref 0–0.61)
EOSINOPHIL NFR BLD AUTO: 0 % (ref 0–6)
ERYTHROCYTE [DISTWIDTH] IN BLOOD BY AUTOMATED COUNT: 14.7 % (ref 11.6–15.1)
EXT PREGNANCY TEST URINE: POSITIVE
EXT. CONTROL: ABNORMAL
GFR SERPL CREATININE-BSD FRML MDRD: 122 ML/MIN/1.73SQ M
GLUCOSE SERPL-MCNC: 93 MG/DL (ref 65–140)
HCT VFR BLD AUTO: 34.7 % (ref 34.8–46.1)
HGB BLD-MCNC: 11 G/DL (ref 11.5–15.4)
IMM GRANULOCYTES # BLD AUTO: 0.03 THOUSAND/UL (ref 0–0.2)
IMM GRANULOCYTES NFR BLD AUTO: 0 % (ref 0–2)
LYMPHOCYTES # BLD AUTO: 1.39 THOUSANDS/ΜL (ref 0.6–4.47)
LYMPHOCYTES NFR BLD AUTO: 15 % (ref 14–44)
MCH RBC QN AUTO: 25.1 PG (ref 26.8–34.3)
MCHC RBC AUTO-ENTMCNC: 31.7 G/DL (ref 31.4–37.4)
MCV RBC AUTO: 79 FL (ref 82–98)
MONOCYTES # BLD AUTO: 0.55 THOUSAND/ΜL (ref 0.17–1.22)
MONOCYTES NFR BLD AUTO: 6 % (ref 4–12)
NEUTROPHILS # BLD AUTO: 7.21 THOUSANDS/ΜL (ref 1.85–7.62)
NEUTS SEG NFR BLD AUTO: 79 % (ref 43–75)
NRBC BLD AUTO-RTO: 0 /100 WBCS
PLATELET # BLD AUTO: 237 THOUSANDS/UL (ref 149–390)
PMV BLD AUTO: 9.4 FL (ref 8.9–12.7)
POTASSIUM SERPL-SCNC: 3.6 MMOL/L (ref 3.5–5.3)
PROT SERPL-MCNC: 6.7 G/DL (ref 6.4–8.4)
RBC # BLD AUTO: 4.39 MILLION/UL (ref 3.81–5.12)
RH BLD: POSITIVE
SODIUM SERPL-SCNC: 133 MMOL/L (ref 135–147)
SPECIMEN EXPIRATION DATE: NORMAL
WBC # BLD AUTO: 9.25 THOUSAND/UL (ref 4.31–10.16)

## 2025-01-31 PROCEDURE — 85025 COMPLETE CBC W/AUTO DIFF WBC: CPT | Performed by: EMERGENCY MEDICINE

## 2025-01-31 PROCEDURE — 99285 EMERGENCY DEPT VISIT HI MDM: CPT | Performed by: EMERGENCY MEDICINE

## 2025-01-31 PROCEDURE — 99284 EMERGENCY DEPT VISIT MOD MDM: CPT

## 2025-01-31 PROCEDURE — 86901 BLOOD TYPING SEROLOGIC RH(D): CPT | Performed by: EMERGENCY MEDICINE

## 2025-01-31 PROCEDURE — 80053 COMPREHEN METABOLIC PANEL: CPT | Performed by: EMERGENCY MEDICINE

## 2025-01-31 PROCEDURE — 86850 RBC ANTIBODY SCREEN: CPT | Performed by: EMERGENCY MEDICINE

## 2025-01-31 PROCEDURE — 81025 URINE PREGNANCY TEST: CPT | Performed by: EMERGENCY MEDICINE

## 2025-01-31 PROCEDURE — 76801 OB US < 14 WKS SINGLE FETUS: CPT

## 2025-01-31 PROCEDURE — 86900 BLOOD TYPING SEROLOGIC ABO: CPT | Performed by: EMERGENCY MEDICINE

## 2025-01-31 PROCEDURE — 84702 CHORIONIC GONADOTROPIN TEST: CPT | Performed by: EMERGENCY MEDICINE

## 2025-01-31 PROCEDURE — 36415 COLL VENOUS BLD VENIPUNCTURE: CPT | Performed by: EMERGENCY MEDICINE

## 2025-01-31 RX ORDER — ACETAMINOPHEN 325 MG/1
975 TABLET ORAL ONCE
Status: COMPLETED | OUTPATIENT
Start: 2025-01-31 | End: 2025-01-31

## 2025-01-31 RX ADMIN — ACETAMINOPHEN 975 MG: 325 TABLET, FILM COATED ORAL at 22:28

## 2025-02-01 VITALS
DIASTOLIC BLOOD PRESSURE: 69 MMHG | BODY MASS INDEX: 27.41 KG/M2 | RESPIRATION RATE: 16 BRPM | HEART RATE: 81 BPM | TEMPERATURE: 97.5 F | OXYGEN SATURATION: 96 % | WEIGHT: 175 LBS | SYSTOLIC BLOOD PRESSURE: 119 MMHG
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
## 2025-02-03 ENCOUNTER — ULTRASOUND (OUTPATIENT)
Age: 40
End: 2025-02-03

## 2025-02-03 VITALS — SYSTOLIC BLOOD PRESSURE: 118 MMHG | DIASTOLIC BLOOD PRESSURE: 72 MMHG | WEIGHT: 187.4 LBS | BODY MASS INDEX: 29.35 KG/M2

## 2025-02-03 DIAGNOSIS — Z32.01 PREGNANCY CONFIRMED BY POSITIVE BLOOD TEST: Primary | ICD-10-CM

## 2025-02-03 PROCEDURE — 76817 TRANSVAGINAL US OBSTETRIC: CPT | Performed by: OBSTETRICS & GYNECOLOGY

## 2025-02-03 PROCEDURE — 99203 OFFICE O/P NEW LOW 30 MIN: CPT | Performed by: OBSTETRICS & GYNECOLOGY
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
## 2025-02-07 ENCOUNTER — HOSPITAL ENCOUNTER (EMERGENCY)
Facility: HOSPITAL | Age: 40
Discharge: HOME | End: 2025-02-07
Attending: EMERGENCY MEDICINE
Payer: COMMERCIAL

## 2025-02-07 ENCOUNTER — NURSE TRIAGE (OUTPATIENT)
Age: 40
End: 2025-02-07

## 2025-02-07 VITALS
DIASTOLIC BLOOD PRESSURE: 75 MMHG | RESPIRATION RATE: 20 BRPM | SYSTOLIC BLOOD PRESSURE: 131 MMHG | OXYGEN SATURATION: 98 % | TEMPERATURE: 97.9 F | HEART RATE: 79 BPM

## 2025-02-07 LAB
ALBUMIN SERPL BCG-MCNC: 4.2 G/DL (ref 3.5–5)
ALP SERPL-CCNC: 64 U/L (ref 34–104)
ALT SERPL W P-5'-P-CCNC: 12 U/L (ref 7–52)
ANION GAP SERPL CALCULATED.3IONS-SCNC: 8 MMOL/L (ref 4–13)
AST SERPL W P-5'-P-CCNC: 18 U/L (ref 13–39)
ATRIAL RATE: 82 BPM
BASOPHILS # BLD AUTO: 0.03 THOUSANDS/ΜL (ref 0–0.1)
BASOPHILS NFR BLD AUTO: 0 % (ref 0–1)
BILIRUB SERPL-MCNC: 0.33 MG/DL (ref 0.2–1)
BUN SERPL-MCNC: 7 MG/DL (ref 5–25)
CALCIUM SERPL-MCNC: 9.4 MG/DL (ref 8.4–10.2)
CARDIAC TROPONIN I PNL SERPL HS: <2 NG/L (ref ?–50)
CHLORIDE SERPL-SCNC: 106 MMOL/L (ref 96–108)
CO2 SERPL-SCNC: 21 MMOL/L (ref 21–32)
CREAT SERPL-MCNC: 0.57 MG/DL (ref 0.6–1.3)
EOSINOPHIL # BLD AUTO: 0.03 THOUSAND/ΜL (ref 0–0.61)
EOSINOPHIL NFR BLD AUTO: 0 % (ref 0–6)
ERYTHROCYTE [DISTWIDTH] IN BLOOD BY AUTOMATED COUNT: 15 % (ref 11.6–15.1)
GFR SERPL CREATININE-BSD FRML MDRD: 118 ML/MIN/1.73SQ M
GLUCOSE SERPL-MCNC: 86 MG/DL (ref 65–140)
HCT VFR BLD AUTO: 39.4 % (ref 34.8–46.1)
HGB BLD-MCNC: 12.1 G/DL (ref 11.5–15.4)
IMM GRANULOCYTES # BLD AUTO: 0.02 THOUSAND/UL (ref 0–0.2)
IMM GRANULOCYTES NFR BLD AUTO: 0 % (ref 0–2)
LYMPHOCYTES # BLD AUTO: 1.55 THOUSANDS/ΜL (ref 0.6–4.47)
LYMPHOCYTES NFR BLD AUTO: 19 % (ref 14–44)
MCH RBC QN AUTO: 25.3 PG (ref 26.8–34.3)
MCHC RBC AUTO-ENTMCNC: 30.7 G/DL (ref 31.4–37.4)
MCV RBC AUTO: 82 FL (ref 82–98)
MONOCYTES # BLD AUTO: 0.49 THOUSAND/ΜL (ref 0.17–1.22)
MONOCYTES NFR BLD AUTO: 6 % (ref 4–12)
NEUTROPHILS # BLD AUTO: 6.28 THOUSANDS/ΜL (ref 1.85–7.62)
NEUTS SEG NFR BLD AUTO: 75 % (ref 43–75)
NRBC BLD AUTO-RTO: 0 /100 WBCS
P AXIS: 71 DEGREES
PLATELET # BLD AUTO: 236 THOUSANDS/UL (ref 149–390)
PMV BLD AUTO: 9.5 FL (ref 8.9–12.7)
POTASSIUM SERPL-SCNC: 4.1 MMOL/L (ref 3.5–5.3)
PR INTERVAL: 128 MS
PROT SERPL-MCNC: 6.9 G/DL (ref 6.4–8.4)
QRS AXIS: 77 DEGREES
QRSD INTERVAL: 86 MS
QT INTERVAL: 352 MS
QTC INTERVAL: 412 MS
RBC # BLD AUTO: 4.79 MILLION/UL (ref 3.81–5.12)
SODIUM SERPL-SCNC: 135 MMOL/L (ref 135–147)
T WAVE AXIS: 67 DEGREES
VENTRICULAR RATE: 82 BPM
WBC # BLD AUTO: 8.4 THOUSAND/UL (ref 4.31–10.16)

## 2025-02-07 PROCEDURE — 84484 ASSAY OF TROPONIN QUANT: CPT

## 2025-02-07 PROCEDURE — 80053 COMPREHEN METABOLIC PANEL: CPT

## 2025-02-07 PROCEDURE — 85025 COMPLETE CBC W/AUTO DIFF WBC: CPT

## 2025-02-07 PROCEDURE — 93010 ELECTROCARDIOGRAM REPORT: CPT | Performed by: INTERNAL MEDICINE

## 2025-02-07 PROCEDURE — 93005 ELECTROCARDIOGRAM TRACING: CPT

## 2025-02-07 PROCEDURE — 36415 COLL VENOUS BLD VENIPUNCTURE: CPT
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
## 2025-02-11 ENCOUNTER — TELEPHONE (OUTPATIENT)
Dept: FAMILY MEDICINE CLINIC | Facility: HOSPITAL | Age: 40
End: 2025-02-11

## 2025-02-12 ENCOUNTER — RESULTS FOLLOW-UP (OUTPATIENT)
Dept: FAMILY MEDICINE CLINIC | Facility: HOSPITAL | Age: 40
End: 2025-02-12

## 2025-02-12 ENCOUNTER — APPOINTMENT (OUTPATIENT)
Dept: LAB | Facility: HOSPITAL | Age: 40
End: 2025-02-12
Payer: COMMERCIAL

## 2025-02-12 ENCOUNTER — OFFICE VISIT (OUTPATIENT)
Dept: FAMILY MEDICINE CLINIC | Facility: HOSPITAL | Age: 40
End: 2025-02-12
Payer: COMMERCIAL

## 2025-02-12 VITALS
HEIGHT: 67 IN | SYSTOLIC BLOOD PRESSURE: 122 MMHG | WEIGHT: 189 LBS | OXYGEN SATURATION: 97 % | HEART RATE: 75 BPM | BODY MASS INDEX: 29.66 KG/M2 | DIASTOLIC BLOOD PRESSURE: 78 MMHG

## 2025-02-12 DIAGNOSIS — M25.511 ACUTE PAIN OF RIGHT SHOULDER: ICD-10-CM

## 2025-02-12 DIAGNOSIS — Z3A.10 10 WEEKS GESTATION OF PREGNANCY: ICD-10-CM

## 2025-02-12 DIAGNOSIS — M54.50 THORACOLUMBAR BACK PAIN: ICD-10-CM

## 2025-02-12 DIAGNOSIS — M54.6 THORACOLUMBAR BACK PAIN: ICD-10-CM

## 2025-02-12 DIAGNOSIS — R71.8 MICROCYTOSIS: ICD-10-CM

## 2025-02-12 DIAGNOSIS — E06.3 HASHIMOTO'S THYROIDITIS: ICD-10-CM
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
## 2025-02-12 DIAGNOSIS — S16.1XXD ACUTE STRAIN OF NECK MUSCLE, SUBSEQUENT ENCOUNTER: ICD-10-CM

## 2025-02-12 DIAGNOSIS — M25.531 ACUTE PAIN OF RIGHT WRIST: ICD-10-CM

## 2025-02-12 DIAGNOSIS — R71.8 MICROCYTOSIS: Primary | ICD-10-CM

## 2025-02-12 DIAGNOSIS — E04.1 LEFT THYROID NODULE: ICD-10-CM
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
## 2025-02-12 LAB
ALBUMIN SERPL BCG-MCNC: 4.2 G/DL (ref 3.5–5)
ALP SERPL-CCNC: 60 U/L (ref 34–104)
ALT SERPL W P-5'-P-CCNC: 11 U/L (ref 7–52)
ANION GAP SERPL CALCULATED.3IONS-SCNC: 6 MMOL/L (ref 4–13)
AST SERPL W P-5'-P-CCNC: 18 U/L (ref 13–39)
BASOPHILS # BLD AUTO: 0.02 THOUSANDS/ΜL (ref 0–0.1)
BASOPHILS NFR BLD AUTO: 0 % (ref 0–1)
BILIRUB SERPL-MCNC: 0.31 MG/DL (ref 0.2–1)
BUN SERPL-MCNC: 8 MG/DL (ref 5–25)
CALCIUM SERPL-MCNC: 9.3 MG/DL (ref 8.4–10.2)
CHLORIDE SERPL-SCNC: 104 MMOL/L (ref 96–108)
CO2 SERPL-SCNC: 25 MMOL/L (ref 21–32)
CREAT SERPL-MCNC: 0.52 MG/DL (ref 0.6–1.3)
EOSINOPHIL # BLD AUTO: 0.04 THOUSAND/ΜL (ref 0–0.61)
EOSINOPHIL NFR BLD AUTO: 1 % (ref 0–6)
ERYTHROCYTE [DISTWIDTH] IN BLOOD BY AUTOMATED COUNT: 14.9 % (ref 11.6–15.1)
GFR SERPL CREATININE-BSD FRML MDRD: 122 ML/MIN/1.73SQ M
GLUCOSE SERPL-MCNC: 85 MG/DL (ref 65–140)
HCT VFR BLD AUTO: 38.3 % (ref 34.8–46.1)
HGB BLD-MCNC: 12 G/DL (ref 11.5–15.4)
IMM GRANULOCYTES # BLD AUTO: 0.04 THOUSAND/UL (ref 0–0.2)
IMM GRANULOCYTES NFR BLD AUTO: 1 % (ref 0–2)
LYMPHOCYTES # BLD AUTO: 1.64 THOUSANDS/ΜL (ref 0.6–4.47)
LYMPHOCYTES NFR BLD AUTO: 20 % (ref 14–44)
MAGNESIUM SERPL-MCNC: 1.9 MG/DL (ref 1.9–2.7)
MCH RBC QN AUTO: 25.1 PG (ref 26.8–34.3)
MCHC RBC AUTO-ENTMCNC: 31.3 G/DL (ref 31.4–37.4)
MCV RBC AUTO: 80 FL (ref 82–98)
MONOCYTES # BLD AUTO: 0.41 THOUSAND/ΜL (ref 0.17–1.22)
MONOCYTES NFR BLD AUTO: 5 % (ref 4–12)
NEUTROPHILS # BLD AUTO: 5.96 THOUSANDS/ΜL (ref 1.85–7.62)
NEUTS SEG NFR BLD AUTO: 73 % (ref 43–75)
NRBC BLD AUTO-RTO: 0 /100 WBCS
PLATELET # BLD AUTO: 244 THOUSANDS/UL (ref 149–390)
PMV BLD AUTO: 9.5 FL (ref 8.9–12.7)
POTASSIUM SERPL-SCNC: 3.8 MMOL/L (ref 3.5–5.3)
PROT SERPL-MCNC: 6.8 G/DL (ref 6.4–8.4)
RBC # BLD AUTO: 4.78 MILLION/UL (ref 3.81–5.12)
SODIUM SERPL-SCNC: 135 MMOL/L (ref 135–147)
TSH SERPL DL<=0.05 MIU/L-ACNC: 1.77 UIU/ML (ref 0.45–4.5)
WBC # BLD AUTO: 8.11 THOUSAND/UL (ref 4.31–10.16)

## 2025-02-12 PROCEDURE — 99214 OFFICE O/P EST MOD 30 MIN: CPT | Performed by: INTERNAL MEDICINE

## 2025-02-12 PROCEDURE — 84443 ASSAY THYROID STIM HORMONE: CPT

## 2025-02-12 PROCEDURE — 85025 COMPLETE CBC W/AUTO DIFF WBC: CPT

## 2025-02-12 PROCEDURE — 36415 COLL VENOUS BLD VENIPUNCTURE: CPT

## 2025-02-12 PROCEDURE — 80053 COMPREHEN METABOLIC PANEL: CPT

## 2025-02-12 PROCEDURE — 83735 ASSAY OF MAGNESIUM: CPT
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
## 2025-02-13 ENCOUNTER — TELEPHONE (OUTPATIENT)
Age: 40
End: 2025-02-13

## 2025-02-13 ENCOUNTER — APPOINTMENT (OUTPATIENT)
Dept: LAB | Facility: HOSPITAL | Age: 40
End: 2025-02-13

## 2025-02-13 LAB
FERRITIN SERPL-MCNC: 8 NG/ML (ref 11–307)
IRON SATN MFR SERPL: 24 % (ref 15–50)
IRON SERPL-MCNC: 118 UG/DL (ref 50–212)
TIBC SERPL-MCNC: 488.6 UG/DL (ref 250–450)
TRANSFERRIN SERPL-MCNC: 349 MG/DL (ref 203–362)
UIBC SERPL-MCNC: 371 UG/DL (ref 155–355)

## 2025-02-13 PROCEDURE — 36415 COLL VENOUS BLD VENIPUNCTURE: CPT

## 2025-02-13 PROCEDURE — 82728 ASSAY OF FERRITIN: CPT

## 2025-02-13 PROCEDURE — 83550 IRON BINDING TEST: CPT

## 2025-02-13 PROCEDURE — 83540 ASSAY OF IRON: CPT

## 2025-02-13 NOTE — TELEPHONE ENCOUNTER
Patient referred for Hashimoto's and is pregnant.  Left message for her to call back and be transferred to [facility].

## 2025-02-13 NOTE — TELEPHONE ENCOUNTER
----- Message from Meli Boyer DO sent at 2/12/2025  5:58 PM EST -----  Labs show stable tsh at 1.775- would repeat in 4 weeks given pregnancy.   Will have iron profile done on these labs if possible

## 2025-02-20 ENCOUNTER — TELEPHONE (OUTPATIENT)
Age: 40
End: 2025-02-20

## 2025-02-20 ENCOUNTER — EVALUATION (OUTPATIENT)
Dept: PHYSICAL THERAPY | Facility: CLINIC | Age: 40
End: 2025-02-20
Payer: COMMERCIAL

## 2025-02-20 DIAGNOSIS — M54.6 THORACOLUMBAR BACK PAIN: ICD-10-CM

## 2025-02-20 DIAGNOSIS — M25.511 ACUTE PAIN OF RIGHT SHOULDER: ICD-10-CM

## 2025-02-20 DIAGNOSIS — M54.50 THORACOLUMBAR BACK PAIN: ICD-10-CM

## 2025-02-20 DIAGNOSIS — M25.531 ACUTE PAIN OF RIGHT WRIST: ICD-10-CM

## 2025-02-20 DIAGNOSIS — S16.1XXD ACUTE STRAIN OF NECK MUSCLE, SUBSEQUENT ENCOUNTER: ICD-10-CM

## 2025-02-20 PROCEDURE — 97162 PT EVAL MOD COMPLEX 30 MIN: CPT

## 2025-02-20 PROCEDURE — 97110 THERAPEUTIC EXERCISES: CPT
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
## 2025-02-25 ENCOUNTER — APPOINTMENT (OUTPATIENT)
Dept: PHYSICAL THERAPY | Facility: CLINIC | Age: 40
End: 2025-02-25
Payer: COMMERCIAL

## 2025-03-04 ENCOUNTER — OFFICE VISIT (OUTPATIENT)
Dept: PHYSICAL THERAPY | Facility: CLINIC | Age: 40
End: 2025-03-04
Payer: COMMERCIAL

## 2025-03-04 DIAGNOSIS — M54.6 THORACOLUMBAR BACK PAIN: ICD-10-CM

## 2025-03-04 DIAGNOSIS — M54.50 THORACOLUMBAR BACK PAIN: ICD-10-CM

## 2025-03-04 DIAGNOSIS — M25.531 ACUTE PAIN OF RIGHT WRIST: ICD-10-CM

## 2025-03-04 DIAGNOSIS — M25.511 ACUTE PAIN OF RIGHT SHOULDER: ICD-10-CM

## 2025-03-04 DIAGNOSIS — S16.1XXD ACUTE STRAIN OF NECK MUSCLE, SUBSEQUENT ENCOUNTER: Primary | ICD-10-CM

## 2025-03-04 PROCEDURE — 97110 THERAPEUTIC EXERCISES: CPT

## 2025-03-04 PROCEDURE — 97140 MANUAL THERAPY 1/> REGIONS: CPT

## 2025-03-04 NOTE — PROGRESS NOTES
Daily Note     Today's date: 3/4/2025  Patient name: Roxanna Capellan  : 1985  MRN: [id number]  Referring provider: Meli Boyer DO  Dx:   Encounter Diagnosis     ICD-10-CM    1. Acute strain of neck muscle, subsequent encounter  S16.1XXD       2. Acute pain of right wrist  M25.531       3. Acute pain of right shoulder  M25.511       4. Thoracolumbar back pain  M54.50     M54.6                      Subjective: Patient reports she hasn't had many headaches or episodes of dizziness. She has been nauseous a lot but notes it is from the pregnancy. Notes at this point her low back is now bothering her more than her neck       Objective: See treatment diary below      Assessment: Patient tolerated treatment well. She responds positively to repeated movements for cervical and thoracic mobility. Added scapular exercises to HEP. If still doing well next visit, I will re-evaluate her to assess lumbar spine at that time.      Plan: re-evaluate in order to assess lumbar spine     Daily Treatment Diary     Precautions: pregnant - due in September  Functional Limitations: working - hairstylist, jogging, headaches  Impairments: cervical extension and R rotation ROM, Bilateral shoulder strength, median nerve tension bilaterally  MedBridge Code: [email or web address]   POC expiration: 2025  FOTO intake:   FOTO status:   FOTO predicted by visit :       POC Expires Reeval for Medicare to be completed  Unit Limit Auth Expiration Date PT/OT/STVisit Limit   2025 By visit n/a N/a N/a BOMN                       Auth Status DATE  3       NA Visit # 1 2        Remaining         MANUAL THERAPY         Seated headache SNAG 3'        Supine SOR nv 4'       Supine cervical PA mobs nv Grade 3/4 2'       Median nerve glides nv        Test cervical flexion rotation nv        Bilateral UT STM         THERAPEUTIC EXERCISE HEP         UBE  nv Nv?                 Self headache snag                    Seated cervical retraction  nv 10                  Seated thoracic extension 3/4  20 w/ half foam roll       Open books   10ea                                                                             NEUROMUSCULAR REEDUCATION           TB row 3/4  BTB 20       TB shld ext 3/4  BTB 20       TB horz abd   GTB 20       Median nerve glides                                                                                                                        THERAPEUTIC ACTIVITY                                                  GAIT TRAINING                                                  MODALITIES

## 2025-03-06 ENCOUNTER — APPOINTMENT (OUTPATIENT)
Dept: PHYSICAL THERAPY | Facility: CLINIC | Age: 40
End: 2025-03-06
Payer: COMMERCIAL

## 2025-03-07 ENCOUNTER — EVALUATION (OUTPATIENT)
Dept: PHYSICAL THERAPY | Facility: CLINIC | Age: 40
End: 2025-03-07
Payer: COMMERCIAL

## 2025-03-07 DIAGNOSIS — M25.531 ACUTE PAIN OF RIGHT WRIST: ICD-10-CM

## 2025-03-07 DIAGNOSIS — S16.1XXD ACUTE STRAIN OF NECK MUSCLE, SUBSEQUENT ENCOUNTER: Primary | ICD-10-CM

## 2025-03-07 DIAGNOSIS — M25.511 ACUTE PAIN OF RIGHT SHOULDER: ICD-10-CM

## 2025-03-07 PROCEDURE — 97110 THERAPEUTIC EXERCISES: CPT
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
## 2025-03-11 ENCOUNTER — OFFICE VISIT (OUTPATIENT)
Dept: PHYSICAL THERAPY | Facility: CLINIC | Age: 40
End: 2025-03-11
Payer: COMMERCIAL

## 2025-03-11 DIAGNOSIS — S16.1XXD ACUTE STRAIN OF NECK MUSCLE, SUBSEQUENT ENCOUNTER: Primary | ICD-10-CM

## 2025-03-11 DIAGNOSIS — M54.50 THORACOLUMBAR BACK PAIN: ICD-10-CM

## 2025-03-11 DIAGNOSIS — M25.531 ACUTE PAIN OF RIGHT WRIST: ICD-10-CM

## 2025-03-11 DIAGNOSIS — M54.6 THORACOLUMBAR BACK PAIN: ICD-10-CM

## 2025-03-11 DIAGNOSIS — M25.511 ACUTE PAIN OF RIGHT SHOULDER: ICD-10-CM

## 2025-03-11 PROCEDURE — 97140 MANUAL THERAPY 1/> REGIONS: CPT

## 2025-03-11 PROCEDURE — 97112 NEUROMUSCULAR REEDUCATION: CPT

## 2025-03-11 PROCEDURE — 97110 THERAPEUTIC EXERCISES: CPT

## 2025-03-11 NOTE — PROGRESS NOTES
Daily Note     Today's date: 3/11/2025  Patient name: Roxanna Capellan  : 1985  MRN: [id number]  Referring provider: Meli Boyer DO  Dx:   Encounter Diagnosis     ICD-10-CM    1. Acute strain of neck muscle, subsequent encounter  S16.1XXD       2. Acute pain of right wrist  M25.531       3. Acute pain of right shoulder  M25.511       4. Thoracolumbar back pain  M54.50     M54.6                      Subjective: Patient notes her back is starting to feel better since last visit. She has less stiffness      Objective: See treatment diary below      Assessment: Patient tolerated treatment well overall. STM and lumbar mobilizations in sitting secondary to pregnancy. Note TTP to lumbar paraspinals. Progress core and hip stability as noted below. She will benefit from skilled PT to address impairments and return to PLOF      Plan: update HEP next visit. Continue mobility and core stability exercises     Daily Treatment Diary     Precautions: pregnant - due in September  Functional Limitations: working - hairstylist, jogging, headaches  Impairments: cervical extension and R rotation ROM, Bilateral shoulder strength, median nerve tension bilaterally  MedBridge Code: [email or web address]   POC expiration: 25  FOTO intake:   FOTO status:   FOTO predicted by visit :       POC Expires Reeval for Medicare to be completed  Unit Limit Auth Expiration Date PT/OT/STVisit Limit   25 By visit n/a N/a N/a BOMN                       Auth Status DATE 2/20 3/4 3/7 3/11     NA Visit # 1 2 3 4      Remaining         MANUAL THERAPY         Seated headache SNAG 3'        Supine SOR nv 4'       Supine cervical PA mobs nv Grade 3/4 2'       Median nerve glides nv        Test cervical flexion rotation nv        Seated lumbar PA mobs EOT   nv Grade 2/3 4'     STM lumbar paraspinals    6'     THERAPEUTIC EXERCISE HEP         UBE  nv Nv?                 Self headache snag                    Seated cervical retraction  nv 10                "  Seated thoracic extension 3/4  20 w/ half foam roll       Open books   10ea                           LTR 3/7   10ea 10ea     Standing lumbar ext 3/7   20      bridge     2x10                         NEUROMUSCULAR REEDUCATION           TB row 3/4  BTB 20       TB shld ext 3/4  BTB 20       TB horz abd   GTB 20       Median nerve glides                              Standing TB 4 way kick     GTB 10ea     TB shld ext sustained with march    20x BTB BTB 20     TB antirotation sidestep    GTB (2 handles) 10ea GTB (2 handles)  10ea     TB paloff press    BTB (2 handles)  10ea BTB (2 handles) 15ea     Sciatic nerve floss seated 3/7   10ea  10ea     Hooklying pball press    10\"x10 10\"x10     Hooklying add     10\"x10     EOT bird dog     10ea                         THERAPEUTIC ACTIVITY                                                  GAIT TRAINING                                                  MODALITIES                                          "

## 2025-03-12 ENCOUNTER — TELEPHONE (OUTPATIENT)
Age: 40
End: 2025-03-12

## 2025-03-18 ENCOUNTER — TELEPHONE (OUTPATIENT)
Age: 40
End: 2025-03-18

## 2025-03-18 ENCOUNTER — OFFICE VISIT (OUTPATIENT)
Dept: PHYSICAL THERAPY | Facility: CLINIC | Age: 40
End: 2025-03-18
Payer: COMMERCIAL

## 2025-03-18 DIAGNOSIS — S16.1XXD ACUTE STRAIN OF NECK MUSCLE, SUBSEQUENT ENCOUNTER: Primary | ICD-10-CM

## 2025-03-18 DIAGNOSIS — M25.531 ACUTE PAIN OF RIGHT WRIST: ICD-10-CM

## 2025-03-18 PROCEDURE — 97112 NEUROMUSCULAR REEDUCATION: CPT

## 2025-03-18 PROCEDURE — 97110 THERAPEUTIC EXERCISES: CPT

## 2025-03-18 PROCEDURE — 97140 MANUAL THERAPY 1/> REGIONS: CPT

## 2025-03-18 NOTE — PROGRESS NOTES
Daily Note     Today's date: 3/18/2025  Patient name: Roxanan Capellan  : 1985  MRN: [id number]  Referring provider: Meli Boyer DO  Dx:   Encounter Diagnosis     ICD-10-CM    1. Acute strain of neck muscle, subsequent encounter  S16.1XXD       2. Acute pain of right wrist  M25.531                      Subjective: Patient reports she continues to see slight improvements in her back pain and stiffness.      Objective: See treatment diary below      Assessment: Patient tolerated treatment well overall. Continued with STM and lumbar mobilizations in sitting secondary to pregnancy which seem to be beneficial for pt with relief post.  Note TTP to lumbar paraspinals. Progress core and hip stability as noted below. She will benefit from skilled PT to address impairments and return to PLOF      Plan: update HEP next visit. Continue mobility and core stability exercises     Daily Treatment Diary     Precautions: pregnant - due in September  Functional Limitations: working - hairstylist, jogging, headaches  Impairments: cervical extension and R rotation ROM, Bilateral shoulder strength, median nerve tension bilaterally  MedBridge Code: [email or web address]   POC expiration: 25  FOTO intake:   FOTO status:   FOTO predicted by visit :       POC Expires Reeval for Medicare to be completed  Unit Limit Auth Expiration Date PT/OT/STVisit Limit   25 By visit n/a N/a N/a BOMN                       Auth Status DATE 2/20 3/4 3/7 3/11 3/18    NA Visit # 1 2 3 4 5     Remaining         MANUAL THERAPY         Seated headache SNAG 3'        Supine SOR nv 4'       Supine cervical PA mobs nv Grade 3/4 2'       Median nerve glides nv        Test cervical flexion rotation nv        Seated lumbar PA mobs EOT   nv Grade 2/3 4' Grade 2/3 4' by MR PT    STM lumbar paraspinals    6' 10'    THERAPEUTIC EXERCISE HEP         UBE  nv Nv?                 Self headache snag                    Seated cervical retraction  nv 10                "  Seated thoracic extension 3/4  20 w/ half foam roll       Open books   10ea                           LTR 3/7   10ea 10ea X10 ea    Standing lumbar ext 3/7   20      bridge     2x10 10x2                        NEUROMUSCULAR REEDUCATION           TB row 3/4  BTB 20       TB shld ext 3/4  BTB 20       TB horz abd   GTB 20       Median nerve glides                              Standing TB 4 way kick     GTB 10ea GTB x10 ea    TB shld ext sustained with march    20x BTB BTB 20 BTB x20    TB antirotation sidestep    GTB (2 handles) 10ea GTB (2 handles)  10ea GTB (2 handles)  10ea    TB paloff press    BTB (2 handles)  10ea BTB (2 handles) 15ea BTB (2 handles) 15ea    Sciatic nerve floss seated 3/7   10ea  10ea X10 ea    Hooklying pball press    10\"x10 10\"x10 10\"x10    Hooklying add     10\"x10 10\"x10    EOT bird dog     10ea X10 ea                        THERAPEUTIC ACTIVITY                                                  GAIT TRAINING                                                  MODALITIES                                          "

## 2025-03-18 NOTE — TELEPHONE ENCOUNTER
Pt called requesting us to fax over a medical release form to [facility].Pt was told to send over all delivering records from these dates :  09/28/2007 - 10/31/2009- 07/29/2011- 05/20/2013.   Faxing number is [phone]

## 2025-03-20 ENCOUNTER — APPOINTMENT (OUTPATIENT)
Dept: PHYSICAL THERAPY | Facility: CLINIC | Age: 40
End: 2025-03-20
Payer: COMMERCIAL

## 2025-03-21 NOTE — PROGRESS NOTES
OB INTAKE INTERVIEW 2025    Patient is 39 y.o. who presents for OB intake at 15w6d.  She is not accompanied by anyone during this encounter.  The father of her baby (Baldemar Capellan) is involved in the pregnancy.      Patient's last menstrual period was 2024.  Ultrasound: Measured 8 weeks 0 days on 3/12/2025   Estimated Date of Delivery: 25 confirmed by dating ultrasound. 8 week US by CT    Signs/Symptoms of Pregnancy  Current pregnancy symptoms: breast tenderness, fatigue, nausea, and vomiting  Headaches: no  Cramping: YES - occasional  Spotting: no  PICA cravings: no    Diabetes:  Body mass index is 30.6 kg/m².  If patient has 1 or more, please order early 1 hour GTT  History of GDM: no  BMI >35 no  History of PCOS or current metformin use: no  History of LGA/macrosomic infant (4000g/9lbs): YES    If patient has 2 or more, please order early 1 hour GTT  BMI>30 no  AMA: YES  First degree relative with type 2 diabetes: no  History of chronic HTN, hyperlipidemia, elevated A1C: no  High risk race (, , ,  or ): no    Hypertension: if you answer yes to any of the following, please order baseline preeclampsia labs (cbc, comprehensive metabolic panel, urine protein creatinine ratio, uric acid)  History of of chronic HTN: no  History of gestational HTN: no  History of preeclampsia, eclampsia, or HELLP syndrome: no  History of diabetes: no  History of lupus,sjogrens syndrome, kidney disease no    Thyroid: if yes order TSH with reflex T4  History of thyroid disease: YES - not medicated - sees [facility] endocrinology - last TSH 25,  resulted 1.77    Bleeding Disorder or Hx of DVT - patient or first degree relative with history of. Order the following if not done previously.   (Factor V, antithrombin III, prothrombin gene mutation, protein C and S Ag, lupus anticoagulant, anticardiolipin, beta-2 glycoprotein):   no    OB/GYN:  History of abnormal  pap smear: no       Date of last pap smear: 23 - WNL  History of HPV: no  History of Herpes/HSV: no  History of other STI: (gonorrhea, chlamydia, trich) no  History of prior : YES x4   History of prior : no  History of  delivery prior to 36 weeks 6 days: no  History of blood transfusion: no  Ok for blood transfusion: YES    Substance screening-   History of tobacco use no  Currently using tobacco no  Substance Use Screen Level:  No Risk    MRSA Screening:   Does the pt have a hx of MRSA? no    Mental Health:  Hx of/or current dx of depression: YES - PPD - never medicated  Hx of/or current dx of anxiety: no  Medications: no   EPDS Screen:  Negative / score: 7    Dental Health:  Patient has seen a dentist in the past 6 months: YES    Immunizations:  Influenza vaccine given this season: NO   Discussed Tdap vaccine:  YES  Discussed COVID Vaccine:  YES - Declined in the past  History of Varicella or Vaccination had chicken pox    Genetic/MFM:  Do you or your partner have a history of any of the following in yourselves or first degree relatives?  Cystic fibrosis: no  Spinal muscular atrophy: no  Hemoglobinopathy/Sickle Cell/Thalassemia: no  Fragile X Intellectual Disability: no    Discussed Carrier Screening being completed once in a lifetime as a standard of care lab test. Place orders for Cystic Fibrosis Gene Test (NSI490) and Spinal Muscular Atrophy DNA (FLT9270).  Patient was informed that prior authorization needs to be completed for these tests and this may take 7-10 business days.  Patient does not desire testing for Cystic Fibrosis and Spinal Muscular Atrophy at this time - records release signed to obtain prior records.    Appointment for Ultrasound at [facility]  Has not been scheduled - will send msg to [facility] to reach out to pt to schedule appropriate U/S    Interview education:  [facility] Pregnancy Essentials Book reviewed, discussed and attached to their AVS: YES     Nurse/Family  Partnership-patient may qualify NO; referral placed NO     Prenatal lab work scripts: YES    Extra labs ordered: 1 hour GTT    Aspirin/Preeclampsia Screen    Risk Level Risk Factor Recommendation   LOW Prior Uncomplicated full-term delivery YES No Aspirin recommendation        MODERATE Nulliparity no Recommend low-dose aspirin if     BMI>30 no 2 or more moderate risk factors    Family History Preeclampsia (mother/sister) no     35yr old or greater YES     Black Race, Concern for SDOH/Low Socioeconomic no     IVF Pregnancy  no     Personal History Risks (low birth weight, prior adverse preg outcome, >10yr preg interval) YES - last delivery was 11 years ago         HIGH History of Preeclampsia no Recommend low-dose aspirin if     Multifetal gestation no 1 or more high risk factors    Chronic HTN no     Type 1 or 2 Diabetes no     Renal Disease no     Autoimmune Disease  YES - Hashimoto's      Contraindications to ASA therapy:  NSAID/ ASA allergy: no  Nasal polyps: no  Asthma with history of ASA induced bronchospasm: no    Relative contraindications:  History of GI bleed: no  Active peptic ulcer disease: no  Severe hepatic dysfunction: no    Patient does meet recommendation to take ASA 162mg during this pregnancy from 12-36 wks to lower her risk of preeclampsia.  Instructions given and patient verbalized understanding.    The patient has a history now or in prior pregnancy notable for:  AMA, Obesity, history delivery of macrosomal infant, chronic fatigue, thyroid nodule, hx ectopic pregnancy        Details that I feel the provider should be aware of: Roxanna was seen here in office for her OB Intake visit, Hx obtained, pt states that this an unplanned but welcomed pregnancy, c/o nausea/vomiting, discussed B6/Unisom dosing, also reviewed avoiding an empty belly.  Discussed need for  LDASA therapy d/t elevated pre-e risk, pt verbalized understanding.  Will start after discussing w/provider at next PNV.  Reviewed  medications safe to take in pregnancy.  [facility] Essentials packet/link reviewed also discussed when to call the office, verbalized understanding. [facility] Baby and Me classes reviewed and how to register for classes. Reviewed timing of prenatal lab draw, verbalized understanding. Pt states that she is not opposed to delivery via C/S especially if baby is LGA - had 9 lb baby w/episiotmy and would like to avoid if possible.     PN1 visit scheduled. The patient was oriented to our practice, the navigator role, reviewed delivering physicians and [facility] for delivery. All questions were answered.    Interviewed by: Manuela Marino RN

## 2025-03-21 NOTE — PATIENT INSTRUCTIONS
Congratulations on your pregnancy!  We thank you for allowing us to participate in your care.    NEXT STEPS    Go to the lab to have your prenatal bloodwork competed if you have not already done so.  There is a listing of [facility] and locations in your prenatal folder. You may also visit [email or web address] or call [phone].   Please be aware that some insurance companies may require you to go to a specific lab (ex. [facility] or [facility]). You can verify this by contacting your insurance company.   If you have decided to be screened for CF and SMA genetic testing, these tests require prior authorization and scheduling.  Prior Authorization is not a guarantee of payment. There may be out of pocket expenses that includes copays, deductibles and or coinsurance for your individual plan.  Please call [phone] if our team has not contacted you in 7 business days.  Please have your blood work completed prior to you next prenatal visit.    If you have decided to have genetic testing done at [facility], that will be scheduled by [facility]. You may have already scheduled this appointment.  If not, please call their office to schedule this appointment.  Based on the referral placed by our office, they will know how to schedule you appropriately.    Contact information for [facility] is located in your prenatal folder. The main phone number to their office is [phone].     Return to our office for your first routine prenatal visit.     Warning Signs During Pregnancy - If you experience any problems or concerns, call the office directly.  The list below includes warning signs your providers would like you to be aware of.  If you experience any of these at any time during your pregnancy, please call us as soon as possible.    Vaginal bleeding   Sharp abdominal pain that does not go away   Fever (more than 100.4?F and is not relieved with Tylenol)   Persistent vomiting lasting greater than 24  Encounter Date: 2/26/2024       History     Chief Complaint   Patient presents with    Arm Pain     LEFT after fall and used arm to catch himself around 1200 today. Swelling noted. States having decreased sensation to fingers.      14-year-old male no significant past medical history presents emergency room for evaluation of left wrist pain after mechanical ground level fall.  Patient fell, landing on an outstretched hand.  He would immediate onset of pain and swelling.  This occurred approximately 4 hours prior to presentation.  No distal paresthesias or loss of motor function.    The history is provided by the patient and the mother.     Review of patient's allergies indicates:  No Known Allergies  History reviewed. No pertinent past medical history.  History reviewed. No pertinent surgical history.  History reviewed. No pertinent family history.  Social History     Tobacco Use    Smoking status: Never    Smokeless tobacco: Never   Substance Use Topics    Alcohol use: Never    Drug use: Never     Review of Systems   Constitutional:  Negative for fever.   HENT:  Negative for sore throat.    Respiratory:  Negative for shortness of breath.    Cardiovascular:  Negative for chest pain.   Gastrointestinal:  Negative for nausea.   Genitourinary:  Negative for dysuria.   Musculoskeletal:  Negative for back pain.   Skin:  Negative for rash.   Neurological:  Negative for weakness.   Hematological:  Does not bruise/bleed easily.       Physical Exam     Initial Vitals   BP Pulse Resp Temp SpO2   02/26/24 1629 02/26/24 1626 02/26/24 1626 02/26/24 1626 02/26/24 1626   130/87 82 20 98.2 °F (36.8 °C) 99 %      MAP       --                Physical Exam    Nursing note and vitals reviewed.  Constitutional: He appears well-developed and well-nourished. He is active.   HENT:   Mouth/Throat: Mucous membranes are moist. Oropharynx is clear.   Eyes: Conjunctivae are normal.   Neck: Neck supple.   Cardiovascular:  Normal rate.         Pulses are strong.    Pulmonary/Chest: No respiratory distress.   Musculoskeletal:      Cervical back: Neck supple.      Comments: Focused exam of the left wrist demonstrates tenderness over the 5th metacarpal.  No scaphoid tenderness.  Pain reproduced with range of motion of the wrist.  Neurovascularly intact distally.     Neurological: He is alert. GCS score is 15. GCS eye subscore is 4. GCS verbal subscore is 5. GCS motor subscore is 6.   Skin: Skin is warm and dry. Capillary refill takes less than 2 seconds.         ED Course   Splint Application    Date/Time: 2024 5:42 PM    Performed by: Torsten Chaudhry PA-C  Authorized by: Laura Yo MD  Consent Done: Yes  Consent: Verbal consent obtained.  Risks and benefits: risks, benefits and alternatives were discussed  Patient identity confirmed: , name and MRN  Location details: left hand  Splint type: ulnar gutter  Supplies used: Ortho-Glass and cotton padding  Post-procedure: The splinted body part was neurovascularly unchanged following the procedure.  Patient tolerance: Patient tolerated the procedure well with no immediate complications  Comments: Patient was splinted by nursing staff under my supervision.        Labs Reviewed - No data to display       Imaging Results              X-Ray Hand 3 View Left (Final result)  Result time 24 17:04:58      Final result by Allie Hernandez MD (24 17:04:58)                   Impression:      As above.      Electronically signed by: Allie Hernanedz  Date:    2024  Time:    17:04               Narrative:    EXAMINATION:  XR WRIST COMPLETE 3 VIEWS LEFT; XR HAND COMPLETE 3 VIEW LEFT; XR FOREARM LEFT    CLINICAL HISTORY:  left hand pain; Pain in left wrist; Pain in left arm    TECHNIQUE:  PA, lateral, and oblique views of the left wrist, two views of the left forearm, and three views of the left hand were performed.    COMPARISON:  None    FINDINGS:  There is subtle lucency at the base of the  hours   Chest pain/Shortness of breath   Pain or burning when you urinate     Call the OFFICE [phone] for any questions/emergencies.  At night or on the weekend, calls go through a triage service, please indicate it is an emergency and the DOCTOR on call will be paged.    Remember to only use MyChart for non-urgent concerns or questions.    [facility] [place]. The address is provided below.     [facility]  [address] [place]  [address]     Please click on the links below to review our Pregnancy Essential Guide.    [facility] Pregnancy Essentials Guide  [facility] Women's Health ([email or web address])     [facility] - Virtual Tour ([employer])      To learn more about the Prenatal Education classes that [facility] offers, click the link below.  Prenatal Education Classes    Click on the link below to review [facility] Lab locations.  [facility] Lab Locations    [email or web address] resource  [employer] is a tool to connect you to community resources you may need.      Thank you,   Manuela IVORY, RN  OB Nurse Navigator    5th metacarpal which could represent nondisplaced fracture or accessory physis.  Please correlate with the location of pain.  No other fracture seen in the hand, wrist, or at the elbow.  Elbow alignment is maintained.  No elbow joint effusion.                                       X-Ray Wrist Complete Left (Final result)  Result time 02/26/24 17:04:58      Final result by Allie Hernandez MD (02/26/24 17:04:58)                   Impression:      As above.      Electronically signed by: Allie Hernandez  Date:    02/26/2024  Time:    17:04               Narrative:    EXAMINATION:  XR WRIST COMPLETE 3 VIEWS LEFT; XR HAND COMPLETE 3 VIEW LEFT; XR FOREARM LEFT    CLINICAL HISTORY:  left hand pain; Pain in left wrist; Pain in left arm    TECHNIQUE:  PA, lateral, and oblique views of the left wrist, two views of the left forearm, and three views of the left hand were performed.    COMPARISON:  None    FINDINGS:  There is subtle lucency at the base of the 5th metacarpal which could represent nondisplaced fracture or accessory physis.  Please correlate with the location of pain.  No other fracture seen in the hand, wrist, or at the elbow.  Elbow alignment is maintained.  No elbow joint effusion.                                       X-Ray Forearm Left (Final result)  Result time 02/26/24 17:04:58      Final result by Allie Hernandez MD (02/26/24 17:04:58)                   Impression:      As above.      Electronically signed by: Allie Hernandez  Date:    02/26/2024  Time:    17:04               Narrative:    EXAMINATION:  XR WRIST COMPLETE 3 VIEWS LEFT; XR HAND COMPLETE 3 VIEW LEFT; XR FOREARM LEFT    CLINICAL HISTORY:  left hand pain; Pain in left wrist; Pain in left arm    TECHNIQUE:  PA, lateral, and oblique views of the left wrist, two views of the left forearm, and three views of the left hand were performed.    COMPARISON:  None    FINDINGS:  There is subtle lucency at the base of the 5th metacarpal which could  represent nondisplaced fracture or accessory physis.  Please correlate with the location of pain.  No other fracture seen in the hand, wrist, or at the elbow.  Elbow alignment is maintained.  No elbow joint effusion.                                       Medications - No data to display  Medical Decision Making  14-year-old male presents emergency room for evaluation of left wrist pain status post fall on outstretched hand.  Patient is nontoxic and well-appearing.  He has tenderness along the 5th metacarpal and ulnar wrist.  No scaphoid tenderness.  Plain films on my independent interpretation do show evidence of base of the 5th metacarpal fracture.  He was subsequently placed in ulnar gutter splint, given sling for comfort.  He remained neurovascularly intact following splint application.  Discussed strict and strong return precautions as well as splint precautions.  Discussed need for orthopedic follow-up.  I have placed a consult to pediatric Orthopedics.    Amount and/or Complexity of Data Reviewed  Radiology:  Decision-making details documented in ED Course.               ED Course as of 02/26/24 1828   Mon Feb 26, 2024   1726 BP: 130/87 [AN]   1726 Temp: 98.2 °F (36.8 °C) [AN]   1726 Pulse: 82 [AN]   1726 Resp: 20 [AN]   1726 SpO2: 99 % [AN]   1726 X-Ray Forearm Left [AN]   1726 X-Ray Wrist Complete Left [AN]   1726 X-Ray Hand 3 View Left  I independently reviewed the images.  There is a subtle lucency at the base of the 5th metacarpal, consistent with patient point tenderness likely represents acute nondisplaced fracture. [AN]      ED Course User Index  [AN] Torsten Chaudhry, JORGE A                           Clinical Impression:  Final diagnoses:  [S62.347A] Closed nondisplaced fracture of base of fifth metacarpal bone of left hand, initial encounter (Primary)          ED Disposition Condition    Discharge Stable          ED Prescriptions    None       Follow-up Information       Follow up With Specialties  Details Why Contact Info    [facility] [place] Emergency Medicine Go to  As needed, If symptoms worsen [id number] [facility] [place] [id number] [phone]    Primary Care Manager  Schedule an appointment as soon as possible for a visit in 1 week               Torsten Chaudhry PA-C  02/26/24 [id number]

## 2025-03-25 ENCOUNTER — OFFICE VISIT (OUTPATIENT)
Dept: PHYSICAL THERAPY | Facility: CLINIC | Age: 40
End: 2025-03-25
Payer: COMMERCIAL

## 2025-03-25 ENCOUNTER — TELEPHONE (OUTPATIENT)
Age: 40
End: 2025-03-25

## 2025-03-25 DIAGNOSIS — M25.511 ACUTE PAIN OF RIGHT SHOULDER: ICD-10-CM

## 2025-03-25 DIAGNOSIS — M54.6 THORACOLUMBAR BACK PAIN: ICD-10-CM

## 2025-03-25 DIAGNOSIS — M25.531 ACUTE PAIN OF RIGHT WRIST: ICD-10-CM

## 2025-03-25 DIAGNOSIS — S16.1XXD ACUTE STRAIN OF NECK MUSCLE, SUBSEQUENT ENCOUNTER: Primary | ICD-10-CM

## 2025-03-25 DIAGNOSIS — M54.50 THORACOLUMBAR BACK PAIN: ICD-10-CM

## 2025-03-25 PROCEDURE — 97110 THERAPEUTIC EXERCISES: CPT

## 2025-03-25 PROCEDURE — 97112 NEUROMUSCULAR REEDUCATION: CPT

## 2025-03-25 NOTE — PROGRESS NOTES
Daily Note     Today's date: 3/25/2025  Patient name: Roxanna Capellan  : 1985  MRN: [id number]  Referring provider: Meli Boyer DO  Dx:   Encounter Diagnosis     ICD-10-CM    1. Acute strain of neck muscle, subsequent encounter  S16.1XXD       2. Acute pain of right wrist  M25.531       3. Acute pain of right shoulder  M25.511       4. Thoracolumbar back pain  M54.50     M54.6                      Subjective: Patient reports mainly just having LBP on both sides of her back but overall is continuing to feel better      Objective: See treatment diary below      Assessment: Patient tolerated treatment well overall. Progressed BLE hip strength/stability today with theraband clamshell and squats. She was challenged by clamshells. Consider adding lunges or single leg RDL next visit. Resume manual therapy next visit. She will benefit from skilled PT to address impairments and return to PLOF      Plan: continue BLE hip strength, core stability     Daily Treatment Diary     Precautions: pregnant - due in September  Functional Limitations: working - hairstylist, jogging, headaches  Impairments: cervical extension and R rotation ROM, Bilateral shoulder strength, median nerve tension bilaterally  MedBridge Code: [email or web address]   POC expiration: 25  FOTO intake:   FOTO status:   FOTO predicted by visit :       POC Expires Reeval for Medicare to be completed  Unit Limit Auth Expiration Date PT/OT/STVisit Limit   25 By visit n/a N/a N/a BOMN                       Auth Status DATE 2/20 3/4 3/7 3/11 3/18 3/25    NA Visit # 1 2 3 4 5 6     Remaining          MANUAL THERAPY          Seated headache SNAG 3'         Supine SOR nv 4'        Supine cervical PA mobs nv Grade 3/4 2'        Median nerve glides nv         Test cervical flexion rotation nv         Seated lumbar PA mobs EOT   nv Grade 2/3 4' Grade 2/3 4' by MR PT nv    STM lumbar paraspinals    6' 10' nv    THERAPEUTIC EXERCISE HEP          UBE  nv Nv?                  "  Self headache snag 2/20                     Seated cervical retraction 2/20 nv 10                   Seated thoracic extension 3/4  20 w/ half foam roll        Open books   10ea        LTR 3/7   10ea 10ea X10 ea 10ea    Standing lumbar ext 3/7   20       bridge     2x10 10x2 2x10    squats       2x10    clamshell       GTB 2x10ea               NEUROMUSCULAR REEDUCATION            TB row 3/4  BTB 20        TB shld ext 3/4  BTB 20        TB horz abd   GTB 20        Median nerve glides                                 Standing TB 4 way kick     GTB 10ea GTB x10 ea GTB 10ea    TB shld ext sustained with march    20x BTB BTB 20 BTB x20 BTB 20    TB antirotation sidestep    GTB (2 handles) 10ea GTB (2 handles)  10ea GTB (2 handles)  10ea GTB (2 handles)  10ea    TB paloff press    BTB (2 handles)  10ea BTB (2 handles) 15ea BTB (2 handles) 15ea BTB (2 handles) 15ea    Sciatic nerve floss seated 3/7   10ea  10ea X10 ea 10ea    Hooklying pball press    10\"x10 10\"x10 10\"x10 10\"x10    Hooklying add     10\"x10 10\"x10 10\"x10    EOT bird dog     10ea X10 ea 10ea                          THERAPEUTIC ACTIVITY                                                       GAIT TRAINING                                                       MODALITIES                                             "

## 2025-03-25 NOTE — TELEPHONE ENCOUNTER
Called Roxanna and left message on VM. Appointment with Dr. Ashby on 3/26/25 has to be rescheduled.

## 2025-03-26 ENCOUNTER — INITIAL PRENATAL (OUTPATIENT)
Age: 40
End: 2025-03-26

## 2025-03-26 VITALS
WEIGHT: 195.4 LBS | DIASTOLIC BLOOD PRESSURE: 62 MMHG | BODY MASS INDEX: 30.67 KG/M2 | SYSTOLIC BLOOD PRESSURE: 122 MMHG | HEIGHT: 67 IN

## 2025-03-26 DIAGNOSIS — O09.522 MULTIGRAVIDA OF ADVANCED MATERNAL AGE IN SECOND TRIMESTER: ICD-10-CM

## 2025-03-26 DIAGNOSIS — Z3A.15 15 WEEKS GESTATION OF PREGNANCY: ICD-10-CM

## 2025-03-26 DIAGNOSIS — O99.210 OBESITY IN PREGNANCY: ICD-10-CM

## 2025-03-26 DIAGNOSIS — Z87.59 HISTORY OF DELIVERY OF MACROSOMAL INFANT: ICD-10-CM

## 2025-03-26 DIAGNOSIS — Z34.81 MULTIGRAVIDA IN FIRST TRIMESTER: Primary | ICD-10-CM

## 2025-03-26 PROCEDURE — OBC

## 2025-03-27 ENCOUNTER — TELEPHONE (OUTPATIENT)
Facility: HOSPITAL | Age: 40
End: 2025-03-27

## 2025-03-27 ENCOUNTER — APPOINTMENT (OUTPATIENT)
Dept: PHYSICAL THERAPY | Facility: CLINIC | Age: 40
End: 2025-03-27
Payer: COMMERCIAL

## 2025-03-27 NOTE — TELEPHONE ENCOUNTER
LVM w/office number asking PT to call back regarding scheduling an anatomy u/s. PT had referral from 2/3/25, but did not want to schedule due to not having insurance. Received msg from PT's OB to schedule her for u/s, asked PT to call office back to see if she now has insurance and/or would like to schedule w/MFM. May use 2/3/25 referral if PT wants to schedule.

## 2025-03-27 NOTE — TELEPHONE ENCOUNTER
----- Message from Manuela CONTRERAS sent at 3/26/2025  2:31 PM EDT -----  Regarding: schedule  Pt seen today for OB intake - currently 15w6d.  Has not been seen by MFM - please contact pt to schedule appropriately.  (Was a no show for initial OB intake on 2/26/25)

## 2025-04-01 ENCOUNTER — INITIAL PRENATAL (OUTPATIENT)
Age: 40
End: 2025-04-01

## 2025-04-01 VITALS — BODY MASS INDEX: 30.7 KG/M2 | WEIGHT: 196 LBS | SYSTOLIC BLOOD PRESSURE: 110 MMHG | DIASTOLIC BLOOD PRESSURE: 68 MMHG

## 2025-04-01 DIAGNOSIS — Z33.1 INCIDENTAL PREGNANCY: ICD-10-CM

## 2025-04-01 DIAGNOSIS — Z36.9 ENCOUNTER FOR ANTENATAL SCREENING: ICD-10-CM

## 2025-04-01 DIAGNOSIS — O09.522 MULTIGRAVIDA OF ADVANCED MATERNAL AGE IN SECOND TRIMESTER: Primary | ICD-10-CM

## 2025-04-01 DIAGNOSIS — Z11.3 SCREENING FOR STDS (SEXUALLY TRANSMITTED DISEASES): ICD-10-CM

## 2025-04-01 DIAGNOSIS — Z01.419 ENCOUNTER FOR GYNECOLOGICAL EXAMINATION WITHOUT ABNORMAL FINDING: ICD-10-CM

## 2025-04-01 LAB
SL AMB  POCT GLUCOSE, UA: NORMAL
SL AMB POCT URINE PROTEIN: NORMAL

## 2025-04-01 PROCEDURE — PNV: Performed by: OBSTETRICS & GYNECOLOGY

## 2025-04-01 PROCEDURE — 81002 URINALYSIS NONAUTO W/O SCOPE: CPT | Performed by: OBSTETRICS & GYNECOLOGY

## 2025-04-01 NOTE — PATIENT INSTRUCTIONS
Chief complaint:   Chief Complaint   Patient presents with   • Follow-up       Vitals:  Visit Vitals  /88 (BP Location: LUE - Left upper extremity, Patient Position: Sitting, Cuff Size: Regular)   Pulse 76   Temp 97 °F (36.1 °C) (Temporal)   Resp 16   Ht 5' 8\" (1.727 m)   Wt 81.2 kg (179 lb)   LMP 05/11/2023 (Exact Date) Comment: periods regular   BMI 27.22 kg/m²       HISTORY OF PRESENT ILLNESS     Pt is here for ER follow-up of possible trigeminal neuralgia. Pt was seen in the ER on 5/11/23 for worsening, severe right sided facial pain. She is unsure exactly when symptoms started as she has been having right ear pain intermittently since the end of March. She was seen in the ER on 5/30/23 for severe right ear pain and popping/crackling sounds. She was also having intermittent vertigo at that time. She states she was diagnosed with an ear infection in the ER and told her \"ear drum was about to burst.\" She was prescribed cefdinir and states she took as much as she could, but stopped after about 6 1/2 days due to side effect of diarrhea and stomach upset. She was seen in the office on 4/13/23 for follow-up of this and her ear pain had improved, but she was still having persistent \"crinkling and crackling\" in her right ear which is most noticeable when she lays on her right side at night. Felt like her ear was popping a lot. She saw audiology on 4/13/23 & exam was normal. Pt did note that she does tend to clench and grind her teeth at night and she also needed some dental work done on her right side, had temporary crowns over her lower right molars which need to be replaced. At that time it was felt that TMJ might be playing a role in her symptoms. She saw ENT in follow-up on 4/25/23 and at that visit did note that she was having episodes of right ear pain which was just in front of her ear or in her ear canal and radiates along her jaw which could last anywhere from 5 minutes to 2 hours. Her ear exam was  Initial prenatal visit.   Nausea:  25mg vit B6 3x/day.   For MSaFP and jean u/s.  Please get prenatal labs done.   Start 162mg ASA daily until 36wks.    RTO in 4wks.   Patient verbalized understanding of today's discussion with all questions and concerns addressed to her satisfaction.       normal. Per ENT, felt that it could be middle ear myoclonus or even palatal myoclonus vs trigeminal neuralgia vs migraine vs TMJ dysfunction. After this her pain persistent and progressively worsened. Pt states that the pain became constant and would start in front of/in her right ear and radiate into her cheek & chin and stops right in the middle of her upper lip/nose. States that she also has tooth pain on her right side. She states the pain is constant, but she will have episodes of much more intense & severe pain which she describes as \"stabbing and lightning bolts.\" States sometimes even her tongue touching her teeth causes pain. She gets some tingling in the front of her upper lip. Chewing and talking exacerbate the pain. Has basically been on a soft diet. Denies any headaches, vision changes, weakness, hearing loss. She went to the ER on 5/11/23 due to the severity of the pain. States that she tried tylenol, ibuprofen, and tylenol #3's and nothing helped with the pain. In the ER she had MRI with IAC w wo contrast which was normal. CBC, CMP, ESR, CRP all normal. She was started on oxcarbazepine 300 mg BID for possible trigeminal neuralgia. Pt was also referred to neurology, but cannot get an appt until September. She did reach out on 5/15/23 via e-advice because her pain was still not well controlled and asking to increase dose. Pt was advised to increase her night time dose of oxcarbazepine to 600 mg and continue with 300 mg in the AM. She states that last night was the first night in a while that she was able to sleep through the night, previously unable to sleep due to pain. However, her pain during the day is still persistent and severe at times. Wondering what else she can do until she is able to see neurology.     Pt does also note that her anxiety has significantly worsened with all of this. She is feeling very anxious especially over the last 1.5-2 weeks. Feels like \"everything is out of control\" and  she \"is crawling out of her skin.\" Thinks that maybe anxiety started to worsen after her buspar dose was increased. States she is shaking and very sweaty at times which is very unusual for her. Feels that she is sweating even when she is not feeling anxious. She states that the ambien she was prescribed for insomnia did seem to be working initially and then became ineffective. She states lately she would wake up about 3 hours after taking it. Denies any side effects with the ambien including sleep walking, groggiess, hangover effect.       Other significant problems:  Patient Active Problem List    Diagnosis Date Noted   • Moderate recurrent major depression (CMD) 10/12/2021     Priority: Low   • Chronic bilateral low back pain without sciatica 07/14/2021     Priority: Low   • DDD (degenerative disc disease), lumbar      Priority: Low   • Chronic pain of multiple joints 04/01/2021     Priority: Low   • Ureteral stone 05/24/2019     Priority: Low   • Palpitations 09/15/2017     Priority: Low   • Gastroesophageal reflux disease without esophagitis 02/27/2017     Priority: Low   • Intractable migraine without aura and without status migrainosus 09/19/2016     Priority: Low   • Insomnia secondary to anxiety 06/10/2016     Priority: Low   • Anxiety 05/27/2016     Priority: Low   • Seasonal allergies 07/23/2013     Priority: Low   • Pain in limb 07/23/2013     Priority: Low   • Edema 07/23/2013     Priority: Low   • Varicose veins of both lower extremities with pain 07/23/2013     Priority: Low   • Unspecified venous (peripheral) insufficiency 07/23/2013     Priority: Low   • Rash 07/23/2013     Priority: Low       PAST MEDICAL, FAMILY AND SOCIAL HISTORY     Medications:  Current Outpatient Medications   Medication Sig Dispense Refill   • oxcarbazepine (TRILEPTAL) 600 MG tablet Take 1 tablet by mouth in the morning and 1 tablet in the evening. 60 tablet 2   • Valacyclovir HCl (Valtrex) 1000 MG Tab Take 1 tablet by mouth  daily. Increase to one tablet twice a day with an outbreak 90 tablet 0   • montelukast (SINGULAIR) 10 MG tablet TAKE 1 TABLET BY MOUTH EVERY NIGHT 90 tablet 3   • zolpidem (AMBIEN) 5 MG tablet Take 1 tablet by mouth nightly as needed for Sleep. 30 tablet 2   • chlorhexidine gluconate (PERIDEX) 0.12 % solution      • clindamycin (CLEOCIN) 300 MG capsule Take 2 capsules by mouth stat, then 1 capusule every 6 hours 40 capsule 1   • HYDROcodone-acetaminophen (NORCO) 7.5-325 MG per tablet Take 1 tablet by mouth every 4 hours as needed. 18 tablet 0   • azithromycin (ZITHROMAX) 250 MG tablet Take 2 tablets by mouth on day 1, then take 1 tablet daily x 4 days 6 tablet 0   • acetaminophen-codeine (TYLENOL NO.3) 300-30 MG per tablet Take 1 tablet by mouth every 4 to 6 hours as needed for pain 12 tablet 0   • Lidocaine HCl (lidocaine viscous) 2 % solution Swish and spit 4 mLs 4 times daily as needed for Oral Discomfort. 100 mL 0   • fluticasone (FLONASE) 50 MCG/ACT nasal spray SHAKE LIQUID AND USE 2 SPRAYS IN EACH NOSTRIL DAILY 48 g 1   • busPIRone (BUSPAR) 30 MG tablet Take 1 tablet by mouth in the morning and 1 tablet in the evening. 180 tablet 0   • topiramate (TOPAMAX) 50 MG tablet TAKE 1 TABLET BY MOUTH TWICE DAILY 180 tablet 0   • clonazePAM (KlonoPIN) 0.5 MG tablet TAKE 1-2 TABLETS BY MOUTH EVERY NIGHT AS NEEDED 30 tablet 0   • rizatriptan (MAXALT) 5 MG tablet Take 1 tablet by mouth as needed for Migraine. Take 1 tablet by mouth at onset of migraine. May repeat after 2 hours if needed. 12 tablet 1   • cyclobenzaprine (FLEXERIL) 10 MG tablet Take 1 tablet by mouth 2 times daily as needed for Muscle spasms. Indications: Muscle Spasm Discontinue methocarbamol. 60 tablet 0   • fexofenadine (ALLEGRA) 180 MG tablet Take 1 tablet by mouth daily. 30 tablet 11   • cetirizine (ZyrTEC) 10 MG tablet TAKE 1 TABLET BY MOUTH DAILY 90 tablet 0   • hydrOXYzine (VISTARIL) 25 MG capsule TAKE 1 TO 2 CAPSULES BY MOUTH EVERY NIGHT AS NEEDED  FOR ANXIETY OR INSOMNIA 60 capsule 0   • albuterol 108 (90 Base) MCG/ACT inhaler Inhale 2 puffs into the lungs every 4 hours as needed for Shortness of Breath or Wheezing. 1 each 3   • meloxicam (MOBIC) 15 MG tablet TAKE 1 TABLET BY MOUTH DAILY 90 tablet 1   • esomeprazole (NexIUM) 40 MG capsule TAKE 1 CAPSULE BY MOUTH DAILY BEFORE BREAKFAST 90 capsule 1   • budesonide (PULMICORT) 1 MG/2ML nebulizer suspension ADD 1 AMPULE TO 240ML NEILMED BOTTLE AND RINSE TWICE DAILY 180 mL 1   • albuterol (VENTOLIN) (2.5 MG/3ML) 0.083% nebulizer solution Take 3 mLs by nebulization every 6 hours as needed for Wheezing. 90 mL 3   • clobetasol (TEMOVATE) 0.05 % cream Apply twice a day to affected area on right shin. 60 g 2   • Spacer/Aero-Holding Chambers (AEROCHAMBER) Use with albuterol 1 each 0   • aspirin-acetaminophen-caffeine (EXCEDRIN MIGRAINE) 250-250-65 MG per tablet Take 1 tablet by mouth every 6 hours as needed for Pain.     • Magnesium 500 MG Cap Take 500 mg by mouth nightly.     • Cholecalciferol (VITAMIN D) 2000 units capsule Take 3 capsules by mouth daily. 270 capsule 3   • Multiple Vitamins-Minerals (MULTIVITAMIN PO) Take 1 tablet by mouth daily.        No current facility-administered medications for this visit.       Allergies:  ALLERGIES:   Allergen Reactions   • Dust      Asthma symptoms   • Mold   (Environmental)      Allergic asthma   • Tizanidine GI UPSET   • Prozac Palpitations       Past Medical  History/Surgeries:  Past Medical History:   Diagnosis Date   • Allergy    • Anxiety 05/27/2016   • Asthma    • Biliary dyskinesia    • Body piercing     nose and navel piercing   • Bronchial spasm    • Chronic bilateral low back pain without sciatica 07/14/2021   • Chronic cholecystitis    • DDD (degenerative disc disease), lumbar    • Depression     tried and failed Paxil, Amitriptyline (vison changes), Wellbutrin (dizzy, insomnia)   • Edema    • GERD (gastroesophageal reflux disease)    • HSV (herpes simplex virus)  infection     vulvar   • Inflammatory bowel disease    • Insomnia    • Kidney stones    • Lumbar compression fracture (CMD) 23 years ago    was treated with a body cast   • Macular degeneration    • PONV (postoperative nausea and vomiting)    • Rash 2021    right lower leg   • Sinusitis, chronic    • Venous insufficiency     lower extremities       Past Surgical History:   Procedure Laterality Date   • Breast biopsy Left     path - benign   •  delivery+postpartum care         • Colonoscopy  2013   • Ctrl nosebleed,anter,complex  2016    nasal cauterization   • Esophagogastroduodenoscopy transoral flex w/bx single or mult  2013    EGD with Bx   • Excision of lingual tonsil     • Laparoscopy procedure unlisted      exploratory laparoscopic examination for question of endometriosis   • Laparoscopy, cholecystectomy  10/24/2011    Cholecystectomy, laparoscopic   • Sinus surgery  2015    ethmoidectomy, max antrostomy   • Sinus surgery  2016, 18    functional endoscopic sinus surgery   • [place] tooth extraction Left     left lower only       Family History:  Family History   Problem Relation Age of Onset   • High blood pressure Mother    • Kidney disease Mother         Kidney stones   • Osteoarthritis Mother    • Dermatitis Mother    • Leukemia Mother    • High blood pressure Father    • Mesothelioma Father         dx    • Other Father         sudden death mid  after lung surgery   • Cancer Father         skin    • Migraine Sister    • Thyroid Brother    • Cancer, Breast Maternal Grandmother 28   • Genitourinary Maternal Grandmother 28        hysterectomy   • Cervical cancer Maternal Grandmother 20   • Stroke Maternal Grandfather         several strokes,  at 56   • Cancer Paternal Grandmother 96        cervical    • Patient is unaware of any medical problems Daughter    • Cancer, Endometrial Neg Hx    • Cancer, Ovarian Neg Hx        Social  History:  Social History     Tobacco Use   • Smoking status: Never   • Smokeless tobacco: Never   Substance Use Topics   • Alcohol use: Yes     Comment: every other week       REVIEW OF SYSTEMS     Review of Systems   Constitutional: Positive for fatigue. Negative for chills and fever.   HENT: Positive for ear pain (right sided). Negative for hearing loss and trouble swallowing.    Respiratory: Negative for shortness of breath.    Cardiovascular: Negative for chest pain and leg swelling.   Skin: Negative for rash.   Neurological: Negative for dizziness, facial asymmetry, weakness, light-headedness, numbness and headaches.        +right sided facial pain   Psychiatric/Behavioral: Positive for dysphoric mood and sleep disturbance. Negative for suicidal ideas. The patient is nervous/anxious.        PHYSICAL EXAM     Physical Exam  Vitals and nursing note reviewed.   Constitutional:       General: She is awake. She is not in acute distress.     Appearance: Normal appearance. She is well-developed and well-groomed. She is not ill-appearing or toxic-appearing.   HENT:      Head: Normocephalic and atraumatic.      Right Ear: Hearing, tympanic membrane, ear canal and external ear normal.      Left Ear: Hearing, tympanic membrane, ear canal and external ear normal.      Nose: Nose normal.      Mouth/Throat:      Lips: Pink.      Mouth: Mucous membranes are moist.      Tongue: Tongue does not deviate from midline.      Pharynx: Oropharynx is clear. Uvula midline.      Neck: Neck supple.   Eyes:      General: Lids are normal. No scleral icterus.     Extraocular Movements: Extraocular movements intact.      Conjunctiva/sclera: Conjunctivae normal.   Neck:      Thyroid: No thyroid mass, thyromegaly or thyroid tenderness.      Trachea: Trachea normal.   Cardiovascular:      Rate and Rhythm: Normal rate and regular rhythm.      Heart sounds: Normal heart sounds, S1 normal and S2 normal. No murmur heard.  Pulmonary:      Effort:  Pulmonary effort is normal. No respiratory distress.      Breath sounds: Normal breath sounds and air entry. No decreased breath sounds, wheezing, rhonchi or rales.   Musculoskeletal:      Right lower leg: No edema.      Left lower leg: No edema.   Lymphadenopathy:      Head:      Right side of head: No submental, submandibular or tonsillar adenopathy.      Left side of head: No submental, submandibular or tonsillar adenopathy.      Cervical: No cervical adenopathy.      Right cervical: No superficial, deep or posterior cervical adenopathy.     Left cervical: No superficial, deep or posterior cervical adenopathy.      Upper Body:      Right upper body: No supraclavicular adenopathy.      Left upper body: No supraclavicular adenopathy.   Skin:     General: Skin is warm and dry.      Findings: No rash.   Neurological:      General: No focal deficit present.      Mental Status: She is alert and oriented to person, place, and time.      Cranial Nerves: No cranial nerve deficit, dysarthria or facial asymmetry.      Sensory: Sensation is intact. No sensory deficit (intact to light touch throughout).      Motor: Motor function is intact. No weakness, tremor, abnormal muscle tone or pronator drift.      Gait: Gait is intact.   Psychiatric:         Attention and Perception: Attention normal.         Mood and Affect: Mood is anxious. Affect is tearful (secondary to pain).         Speech: Speech normal.         Behavior: Behavior normal. Behavior is cooperative.         ASSESSMENT/PLAN     1. Right facial pain  -Symptoms consistent with trigeminal neuralgia. There may be a component of TMJ dysfunction as well, however given the severity of pain seems more likely trigeminal neuralgia.   -MRI with IAC w wo contrast 5/11/23 normal.   -Pt is currently on oxcarbazepine 300 mg in AM and 600 mg in PM. Pain is still not well controlled. Will increase up to 600 mg BID and titrate up every 3 days as needed to max dose of 1800 mg per  day.   -Pt is already on topiramate 50 mg BID for migraine prophylaxis which can be used as an adjunct treatment for trigeminal neuralgia. Consider adding gabapentin if pain not controlled with max dose of oxcarbazepine.   -Pt already referred to neurology, but unable to get appt until 9/2023. She was advised she can also check with insurance to find other neurologists in network and can place referral if she is able to be seen outside of [place] sooner.   -Pt will update me next week on her symptoms and follow-up pending when she is able to get in to see neurology.     2. Anxiety  -Likely increased secondary to severe pain. Pt also feels that anxiety worsened when her buspar dose was increased. It does seem that her anxiety has worsened in the past when she was on multiple medications that increase serotonin levels. Will decrease buspirone back down to 20 mg BID.   -Continue clonazepam 0.5 mg daily-BID prn for severe anxiety/panic attacks.   -Pt has tried and failed the following medications in regards to her depression and anxiety: fluoxetine, escitalopram, wellbutrin, duloxetine, and desvenlafaxine due to either side effects or ineffectiveness.   -Discussed that given that pt has tried & failed multiple medications for her anxiety, feel that it would be beneficial at this point to see psychiatry and pt is agreeable. Referral was placed.   -Counseled on importance of regular exercise and meditation/relaxation techniques for anxiety/depression management.   -Previously counseled that she would benefit from adding cognitive behavioral therapy, but has declined.   -Follow-up in 6-8 weeks as scheduled or sooner as needed if symptoms worsening.

## 2025-04-01 NOTE — PROGRESS NOTES
Initial PN 1 visit.  16wk/5d. KADEN: 9/11/25.  Last annual visit: 2/23/23  Last pap: 2/23/23; neg/HPV: neg  GC/CH collected:yes  PN 1 labs not completed yet; waiting for her insurance.  Blood type: O positive  1 hour GTT: not completed yet  Blue information packet given: yes  Level II US scheduled?- no  Order for AFP screen placed  Denies cramping or spotting; just feels pressure  She is starting to feel movement

## 2025-04-01 NOTE — PROGRESS NOTES
Assessment/Plan:      Pregnancy at 16 and 5/7 weeks         Initial labs drawn. No (provide explanation): will do in the next 2 weeks, waiting for insurance to kick in.    Patient is taking Prenatal vitamins.  Problem list reviewed and updated.  AFP3 discussed: requested.  Role of ultrasound in pregnancy discussed; fetal survey: requested.  Amniocentesis discussed: not indicated.  Follow up in 4 weeks.    All 4 babies induced @ 40wks, never went into spontaneous labor or ROM.  G4 baby was 9 lbs.        Hermes Capellan is being seen today for her first obstetrical visit.  This is not a planned pregnancy. She is at 16w5d gestation. Her obstetrical history is significant for advanced maternal age and G1 born with right club foot . Relationship with FOB: spouse, living together. Patient does intend to breast feed. Pregnancy history fully reviewed.      The following portions of the patient's history were reviewed and updated as appropriate: allergies, current medications, past family history, past medical history, past social history, past surgical history, and problem list.        Review of Systems    Pertinent items are noted in HPI.          Objective     /68   Wt 88.9 kg (196 lb)   LMP 12/05/2024   BMI 30.70 kg/m²     General Appearance:    Alert, cooperative, no distress, appears stated age   Head:    Normocephalic, without obvious abnormality, atraumatic   Eyes:    PERRL, conjunctiva/corneas clear, EOM's intact, fundi     benign, both eyes   Ears:    Normal TM's and external ear canals, both ears   Nose:   Nares normal, septum midline, mucosa normal, no drainage    or sinus tenderness   Throat:   Lips, mucosa, and tongue normal; teeth and gums normal   Neck:   Supple, symmetrical, trachea midline, no adenopathy;     thyroid:  no enlargement/tenderness/nodules; no carotid    bruit or JVD   Back:     Symmetric, no curvature, ROM normal, no CVA tenderness   Lungs:     Clear to auscultation  bilaterally, respirations unlabored   Chest Wall:    No tenderness or deformity    Heart:    Regular rate and rhythm, S1 and S2 normal, no murmur, rub   or gallop   Breast Exam:    No tenderness, masses, or nipple abnormality   Abdomen:     Soft, non-tender, bowel sounds active all four quadrants,     no masses, no organomegaly   Genitalia:    Normal female without lesion, discharge or tenderness   Rectal:    Normal tone, no masses or tenderness; guaiac negative stool   Extremities:   Extremities normal, atraumatic, no cyanosis or edema   Pulses:   2+ and symmetric all extremities   Skin:   Skin color, texture, turgor normal, no rashes or lesions   Lymph nodes:   Cervical, supraclavicular, and axillary nodes normal   Neurologic:   CNII-XII intact, normal strength, sensation and reflexes     throughout         FHR noted at 140 bpm.    Vaginal bleeding Noted.   Vaginal discharge: normal.    A/P:   Initial prenatal visit.   Nausea:  25mg vit B6 3x/day.   For MSaFP and jean u/s.  Please get prenatal labs done.   Start 162mg ASA daily until 36wks.    RTO in 4wks.   Patient verbalized understanding of today's discussion with all questions and concerns addressed to her satisfaction.        51% of 30 min visit spent on counseling and coordination of care.       BEVERLEY Ashby MD, FACOG

## 2025-04-04 LAB
C TRACH RRNA CVX QL NAA+PROBE: NEGATIVE
CYTOLOGIST CVX/VAG CYTO: NORMAL
DX ICD CODE: NORMAL
HPV GENOTYPE REFLEX: NORMAL
HPV I/H RISK 4 DNA CVX QL PROBE+SIG AMP: NEGATIVE
N GONORRHOEA RRNA CVX QL NAA+PROBE: NEGATIVE
OTHER STN SPEC: NORMAL
PATH REPORT.FINAL DX SPEC: NORMAL
SL AMB NOTE:: NORMAL
SL AMB SPECIMEN ADEQUACY: NORMAL
SL AMB TEST METHODOLOGY: NORMAL

## 2025-04-09 ENCOUNTER — RESULTS FOLLOW-UP (OUTPATIENT)
Dept: OTHER | Facility: HOSPITAL | Age: 40
End: 2025-04-09

## 2025-04-14 ENCOUNTER — TELEPHONE (OUTPATIENT)
Dept: OBGYN CLINIC | Facility: MEDICAL CENTER | Age: 40
End: 2025-04-14

## 2025-04-14 NOTE — TELEPHONE ENCOUNTER
2ND TRIMESTER CHECK-IN CALL     Overall how are you doing? Patient reports she is doing well overall, but notes continues w/occasional nausea - reviewed avoiding an empty belly and B6, verbalized understanding    Compliant with routine OB care appointments? Yes, although started w/late prenatal care.    Have you completed your 1st trimester labs? No, reminded patient to complete blood work. States she is in the process of obtaining insurance    If you had NIPS with MFM, do you have a order for MSAFP? Yes, patient has order and was reminded to have done in the appropriate time frame.   Can be completed 15w-21w6d, ideally 16w-18w    Have you seen MFM and do you have your detailed US scheduled? Yes, scheduled 4/30/25.    Pregnancy Education-have you had a chance to review the classes offered and registered? No, reviewed prenatal classes and instructions for registering with patient.     Additional Notes: offer no other c/o at this time

## 2025-04-25 PROBLEM — O09.299 HISTORY OF MACROSOMIA IN INFANT IN PRIOR PREGNANCY, CURRENTLY PREGNANT: Status: ACTIVE | Noted: 2025-04-25

## 2025-04-25 PROBLEM — O09.522 MULTIGRAVIDA OF ADVANCED MATERNAL AGE IN SECOND TRIMESTER: Status: ACTIVE | Noted: 2025-04-25

## 2025-04-29 ENCOUNTER — RESULTS FOLLOW-UP (OUTPATIENT)
Age: 40
End: 2025-04-29

## 2025-04-30 ENCOUNTER — ROUTINE PRENATAL (OUTPATIENT)
Dept: PERINATAL CARE | Facility: OTHER | Age: 40
End: 2025-04-30
Attending: OBSTETRICS & GYNECOLOGY
Payer: COMMERCIAL

## 2025-04-30 VITALS
DIASTOLIC BLOOD PRESSURE: 60 MMHG | HEART RATE: 98 BPM | HEIGHT: 67 IN | BODY MASS INDEX: 31.33 KG/M2 | WEIGHT: 199.6 LBS | SYSTOLIC BLOOD PRESSURE: 110 MMHG

## 2025-04-30 DIAGNOSIS — O99.280 THYROID DISEASE AFFECTING PREGNANCY: ICD-10-CM

## 2025-04-30 DIAGNOSIS — O09.299 HISTORY OF MACROSOMIA IN INFANT IN PRIOR PREGNANCY, CURRENTLY PREGNANT: ICD-10-CM

## 2025-04-30 DIAGNOSIS — O09.522 MULTIGRAVIDA OF ADVANCED MATERNAL AGE IN SECOND TRIMESTER: ICD-10-CM

## 2025-04-30 DIAGNOSIS — Z36.3 ENCOUNTER FOR ANTENATAL SCREENING FOR MALFORMATION USING ULTRASOUND: ICD-10-CM

## 2025-04-30 DIAGNOSIS — O35.2XX0 HEREDITARY DISEASE IN FAMILY POSSIBLY AFFECTING FETUS, AFFECTING MANAGEMENT OF MOTHER IN PREGNANCY, SINGLE OR UNSPECIFIED FETUS: Primary | ICD-10-CM

## 2025-04-30 DIAGNOSIS — E06.3 HASHIMOTO'S THYROIDITIS: ICD-10-CM

## 2025-04-30 DIAGNOSIS — Z36.82 NUCHAL TRANSLUCENCY OF FETUS ON PRENATAL ULTRASOUND: ICD-10-CM

## 2025-04-30 DIAGNOSIS — Z36.86 ENCOUNTER FOR ANTENATAL SCREENING FOR CERVICAL LENGTH: ICD-10-CM

## 2025-04-30 DIAGNOSIS — Z3A.21 21 WEEKS GESTATION OF PREGNANCY: ICD-10-CM

## 2025-04-30 DIAGNOSIS — E07.9 THYROID DISEASE AFFECTING PREGNANCY: ICD-10-CM

## 2025-04-30 PROBLEM — Z32.01 PREGNANCY CONFIRMED BY POSITIVE BLOOD TEST: Status: ACTIVE | Noted: 2025-04-30

## 2025-04-30 PROCEDURE — 99244 OFF/OP CNSLTJ NEW/EST MOD 40: CPT | Performed by: OBSTETRICS & GYNECOLOGY

## 2025-04-30 PROCEDURE — 76817 TRANSVAGINAL US OBSTETRIC: CPT | Performed by: OBSTETRICS & GYNECOLOGY

## 2025-04-30 PROCEDURE — 76811 OB US DETAILED SNGL FETUS: CPT | Performed by: OBSTETRICS & GYNECOLOGY

## 2025-04-30 RX ORDER — ASPIRIN 81 MG/1
162 TABLET, CHEWABLE ORAL DAILY
COMMUNITY

## 2025-04-30 NOTE — LETTER
2025     Anu Turk MD  [address] [address]    Patient: Roxanna Capellan   YOB: 1985   Date of Visit: 2025       Dear Dr. Anu Turk MD:    Thank you for referring Roxanna Capellan to me for evaluation. Below are my notes for this consultation.    If you have questions, please do not hesitate to call me. I look forward to following your patient along with you.         Sincerely,        Aura Shah MD        CC: No Recipients    Aura Shah MD  2025  4:44 PM  Sign when Signing Visit  OFFICE CONSULT  Referring physician:   Anu Turk Md  [address] [place] [address]      Dear Dr. Turk      Thank you for requesting a  consultation on your patient Ms. Roxanna Capellan for the following indications:  Genetic screening    History  Medications: Prenatal vitamins and aspirin 162 mg daily  Allergies to medications: None  Past medical history: Hashimoto's thyroiditis currently controlled on no medication with a TSH of 1.775 on 2025.  In  she had elevated microsomal antibodies of 265 and thyroglobulin antibodies of 2.1  Past surgical history: Laparoscopic right salpingectomy in  and in  she had ovarian cyst removal,  she had an ultrasound-guided thyroid biopsy and also has a history of wisdom tooth extraction  Past obstetrical history: 4 prior term pregnancies between 1649-2474 all delivered vaginally without complications.  Her last son weighed 9 pounds.   Social history: Reports no substance use  First generation family history: Family history of hypothyroidism in both sisters and her father.  She has a son who has clubfoot and 1 son who has kidney reflux.     Ultrasound findings:  The ultrasound shows a fetus concordant with dates. The nasal bone and nuchal translucency appears normal. No malformations are seen on today's early ultrasound.     The patient was  informed of the findings and counseled about the limitations of the exam in detecting all forms of fetal congenital abnormalities.    She does not report any vaginal bleeding or uterine cramping or contractions.      Specific counseling was provided on the following problems:  We discussed the options for genetic screening which include invasive testing on the fetal placenta or on fetal skin cells within the amniotic fluid and compared this to noninvasive testing which includes cell free DNA screening.  We reviewed the risks, the benefits and the limitations of each.  In the end patient declined any genetic screening.   Advanced Maternal Age (AMA) is defined as maternal age 35 or greater at the best estimation of the due date. Advanced maternal age is associated with an increased risk of several pregnancy outcomes, including aneuploidy/genetic syndromes, growth abnormalities,  delivery, stillbirth, maternal hypertensive disorders, and gestational diabetes. Despite these increased risks, many women of advanced maternal age have normal, healthy pregnancy outcomes, particularly if they have no co-existing medical conditions. Risk of adverse outcomes is proportional to patient age. These risks can be mitigated but not eliminated by optimizing maternal medical health preconception, aspirin prophylaxis when indicated, and optimizing weight gain.  We generally advise a third trimester growth assessment for the indication of advanced maternal age.    Encouraged her to complete her MSAFP by 21 weeks and 6 days.  Based on her age and history of macrosomia in a previous pregnancy agree with her completing an early Glucola screen and if normal rescreening again at 28 weeks.  In the first trimester, the normal range for TSH level is 0.1 to 2.5 mIU/L; this level increases to 0.2 to 3.0 mIU/L in the second trimester and 0.3 to 3.0 mIU/L in the third trimester.  Her TSH levels should be rechecked at 28 weeks.  Elevated  TPO/microsomal and thyroglobulin antibodies can cross the fetal placenta and in rare cases can cause a transient state of hypothyroidism and goiter in the .  Recommend alerting her pediatrician/NICU staff to watch for signs of  hypothyroidism after birth.       Future ultrasounds ordered today:   Fetal Level II ultrasound imaging is recommended for growth at 32 weeks' gestation.    Pre visit time reviewing her records   10 minutes  Face to face time 5 minutes  Post visit time on documentation of note, updating her problem list, adding orders and prescriptions 15 minutes.  Procedures that were completed today were charged separately.   The level of decision making was low level complexity.    Aura Shah MD

## 2025-04-30 NOTE — PROGRESS NOTES
Ultrasound Probe Disinfection    A transvaginal ultrasound was performed.   Prior to use, disinfection was performed with High Level Disinfection Process ([employer]).  Probe serial number U2: [id number] was used.    Casandra Nunez  04/30/25  2:39 PM

## 2025-04-30 NOTE — LETTER
2025     Anu Turk MD  [address] [address]    Patient: Roxanna Capellan   YOB: 1985   Date of Visit: 2025       Dear Dr. Anu Turk MD:    Thank you for referring Roxanna Capellan to me for evaluation. Below are my notes for this consultation.    If you have questions, please do not hesitate to call me. I look forward to following your patient along with you.         Sincerely,        Aura Shah MD        CC: No Recipients    Aura Shah MD  2025  4:30 PM  Incomplete  OFFICE CONSULT  Referring physician:   Anu Turk Md  [address] [place] [address]      Dear Dr. Turk      Thank you for requesting a  consultation on your patient Ms. Roxanna Capellan for the following indications:  Genetic screening    History  Medications: Prenatal vitamins and aspirin 162 mg daily  Allergies to medications: None  Past medical history: Hashimoto's thyroiditis currently controlled on no medication with a TSH of 1.775 on 2025.  In  she had elevated microsomal antibodies of 265 and thyroglobulin of 2.1  Past surgical history: Laparoscopic right salpingectomy in  and in  she had ovarian cyst removal,  she had an ultrasound-guided thyroid biopsy and also has a history of wisdom tooth extraction  Past obstetrical history: 4 prior term pregnancies between 7960-1556 all delivered vaginally without complications.  Her last son weighed 9 pounds.   Social history: Reports no substance use  First generation family history: Family history of hypothyroidism in both sisters and her father.  She has a son who has clubfoot and 1 son who has kidney reflux.     Ultrasound findings:  The ultrasound shows a fetus concordant with dates. The nasal bone and nuchal translucency appears normal. No malformations are seen on today's early ultrasound.     The patient was informed of the findings  and counseled about the limitations of the exam in detecting all forms of fetal congenital abnormalities.    She does not report any vaginal bleeding or uterine cramping or contractions.      Specific counseling was provided on the following problems:  We discussed the options for genetic screening which include invasive testing on the fetal placenta or on fetal skin cells within the amniotic fluid and compared this to noninvasive testing which includes cell free DNA screening.  We reviewed the risks, the benefits and the limitations of each.  In the end patient declined any genetic screening.       Future tests recommended:  The results of her NIPT will return in 7-10 days.  Screening for spina bifida with an MSAFP screen is a future test that can be prescribed through her OB office.  This blood work should be drawn preferably at 16 to 18 weeks so that the results return prior to her next scan.  The test though can be run until 21 weeks and 6 days if needed.  Advanced Maternal Age (AMA) is defined as maternal age 35 or greater at the best estimation of the due date. Advanced maternal age is associated with an increased risk of several pregnancy outcomes, including aneuploidy/genetic syndromes, growth abnormalities,  delivery, stillbirth, maternal hypertensive disorders, and gestational diabetes. Despite these increased risks, many women of advanced maternal age have normal, healthy pregnancy outcomes, particularly if they have no co-existing medical conditions. Risk of adverse outcomes is proportional to patient age. These risks can be mitigated but not eliminated by optimizing maternal medical health preconception, aspirin prophylaxis when indicated, and optimizing weight gain.  We generally advise a third trimester growth assessment for the indication of advanced maternal age.    Encouraged her to complete her MSAFP by 21 weeks and 6 days.  Based on her age and history of macrosomia in a previous pregnancy  agree with her completing an early Glucola screen and if normal rescreening again at 28 weeks.  In the first trimester, the normal range for TSH level is 0.1 to 2.5 mIU/L; this level increases to 0.2 to 3.0 mIU/L in the second trimester and 0.3 to 3.0 mIU/L in the third trimester.  Her TSH levels should be rechecked at 28 weeks.  Elevated TPO/microsomal and thyroglobulin antibodies can cross the fetal placenta and in rare cases cause a transient state of hypothyroidism in the fetus that would be present postnatally and can in rare cases develop a goiter.  Recommend alerting her pediatrician/NICU staff to watch for signs of  hypothyroidism after birth.       Future ultrasounds ordered today:   Fetal Level II ultrasound imaging is recommended for growth at 32 weeks' gestation.    Pre visit time reviewing her records   10 minutes  Face to face time 5 minutes  Post visit time on documentation of note, updating her problem list, adding orders and prescriptions 10 minutes.  Procedures that were completed today were charged separately.   The level of decision making was low level complexity.    Aura Shah MD

## 2025-04-30 NOTE — PROGRESS NOTES
OFFICE CONSULT  Referring physician:   Anu Turk Md  [address] [facility]  [address] [place] [address]      Dear Dr. Turk      Thank you for requesting a  consultation on your patient Ms. Roxanna Capellan for the following indications:  Genetic screening    History  Medications: Prenatal vitamins and aspirin 162 mg daily  Allergies to medications: None  Past medical history: Hashimoto's thyroiditis currently controlled on no medication with a TSH of 1.775 on 2025.  In  she had elevated microsomal antibodies of 265 and thyroglobulin antibodies of 2.1  Past surgical history: Laparoscopic right salpingectomy in  and in  she had ovarian cyst removal,  she had an ultrasound-guided thyroid biopsy and also has a history of wisdom tooth extraction  Past obstetrical history: 4 prior term pregnancies between 1739-0624 all delivered vaginally without complications.  Her last son weighed 9 pounds.   Social history: Reports no substance use  First generation family history: Family history of hypothyroidism in both sisters and her father.  She has a son who has clubfoot and 1 son who has kidney reflux.     Ultrasound findings:  The ultrasound shows a fetus concordant with dates. The nasal bone and nuchal translucency appears normal. No malformations are seen on today's early ultrasound.     The patient was informed of the findings and counseled about the limitations of the exam in detecting all forms of fetal congenital abnormalities.    She does not report any vaginal bleeding or uterine cramping or contractions.      Specific counseling was provided on the following problems:  We discussed the options for genetic screening which include invasive testing on the fetal placenta or on fetal skin cells within the amniotic fluid and compared this to noninvasive testing which includes cell free DNA screening.  We reviewed the risks, the benefits and the limitations of each.  In the end  patient declined any genetic screening.   Advanced Maternal Age (AMA) is defined as maternal age 35 or greater at the best estimation of the due date. Advanced maternal age is associated with an increased risk of several pregnancy outcomes, including aneuploidy/genetic syndromes, growth abnormalities,  delivery, stillbirth, maternal hypertensive disorders, and gestational diabetes. Despite these increased risks, many women of advanced maternal age have normal, healthy pregnancy outcomes, particularly if they have no co-existing medical conditions. Risk of adverse outcomes is proportional to patient age. These risks can be mitigated but not eliminated by optimizing maternal medical health preconception, aspirin prophylaxis when indicated, and optimizing weight gain.  We generally advise a third trimester growth assessment for the indication of advanced maternal age.    Encouraged her to complete her MSAFP by 21 weeks and 6 days.  Based on her age and history of macrosomia in a previous pregnancy agree with her completing an early Glucola screen and if normal rescreening again at 28 weeks.  In the first trimester, the normal range for TSH level is 0.1 to 2.5 mIU/L; this level increases to 0.2 to 3.0 mIU/L in the second trimester and 0.3 to 3.0 mIU/L in the third trimester.  Her TSH levels should be rechecked at 28 weeks.  Elevated TPO/microsomal and thyroglobulin antibodies can cross the fetal placenta and in rare cases can cause a transient state of hypothyroidism and goiter in the .  Recommend alerting her pediatrician/NICU staff to watch for signs of  hypothyroidism after birth.       Future ultrasounds ordered today:   Fetal Level II ultrasound imaging is recommended for growth at 32 weeks' gestation.    Pre visit time reviewing her records   10 minutes  Face to face time 5 minutes  Post visit time on documentation of note, updating her problem list, adding orders and prescriptions 15  minutes.  Procedures that were completed today were charged separately.   The level of decision making was low level complexity.    Aura Shah MD

## 2025-05-05 ENCOUNTER — TRANSCRIBE ORDERS (OUTPATIENT)
Dept: LAB | Facility: HOSPITAL | Age: 40
End: 2025-05-05

## 2025-05-05 ENCOUNTER — APPOINTMENT (OUTPATIENT)
Dept: LAB | Facility: HOSPITAL | Age: 40
End: 2025-05-05
Attending: OBSTETRICS & GYNECOLOGY
Payer: COMMERCIAL

## 2025-05-05 ENCOUNTER — APPOINTMENT (OUTPATIENT)
Dept: LAB | Facility: HOSPITAL | Age: 40
End: 2025-05-05
Payer: COMMERCIAL

## 2025-05-05 DIAGNOSIS — O99.210 OBESITY IN PREGNANCY: ICD-10-CM

## 2025-05-05 DIAGNOSIS — O09.522 MULTIGRAVIDA OF ADVANCED MATERNAL AGE IN SECOND TRIMESTER: ICD-10-CM

## 2025-05-05 DIAGNOSIS — Z36.9 ENCOUNTER FOR ANTENATAL SCREENING: ICD-10-CM

## 2025-05-05 DIAGNOSIS — Z87.59 HISTORY OF DELIVERY OF MACROSOMAL INFANT: ICD-10-CM

## 2025-05-05 DIAGNOSIS — Z33.1 INCIDENTAL PREGNANCY: ICD-10-CM

## 2025-05-05 DIAGNOSIS — Z34.81 MULTIGRAVIDA IN FIRST TRIMESTER: ICD-10-CM

## 2025-05-05 LAB
ABO GROUP BLD: NORMAL
BACTERIA UR QL AUTO: ABNORMAL /HPF
BASOPHILS # BLD AUTO: 0.02 THOUSANDS/ÂΜL (ref 0–0.1)
BASOPHILS NFR BLD AUTO: 0 % (ref 0–1)
BILIRUB UR QL STRIP: NEGATIVE
BLD GP AB SCN SERPL QL: NEGATIVE
CLARITY UR: ABNORMAL
COLOR UR: YELLOW
EOSINOPHIL # BLD AUTO: 0.1 THOUSAND/ÂΜL (ref 0–0.61)
EOSINOPHIL NFR BLD AUTO: 1 % (ref 0–6)
ERYTHROCYTE [DISTWIDTH] IN BLOOD BY AUTOMATED COUNT: 14.9 % (ref 11.6–15.1)
GLUCOSE UR STRIP-MCNC: NEGATIVE MG/DL
GRAN CASTS #/AREA URNS LPF: ABNORMAL /[LPF]
HCT VFR BLD AUTO: 37.9 % (ref 34.8–46.1)
HGB BLD-MCNC: 12.4 G/DL (ref 11.5–15.4)
HGB UR QL STRIP.AUTO: NEGATIVE
IMM GRANULOCYTES # BLD AUTO: 0.05 THOUSAND/UL (ref 0–0.2)
IMM GRANULOCYTES NFR BLD AUTO: 1 % (ref 0–2)
KETONES UR STRIP-MCNC: NEGATIVE MG/DL
LEUKOCYTE ESTERASE UR QL STRIP: ABNORMAL
LYMPHOCYTES # BLD AUTO: 1.68 THOUSANDS/ÂΜL (ref 0.6–4.47)
LYMPHOCYTES NFR BLD AUTO: 17 % (ref 14–44)
MCH RBC QN AUTO: 27.6 PG (ref 26.8–34.3)
MCHC RBC AUTO-ENTMCNC: 32.7 G/DL (ref 31.4–37.4)
MCV RBC AUTO: 84 FL (ref 82–98)
MONOCYTES # BLD AUTO: 0.52 THOUSAND/ÂΜL (ref 0.17–1.22)
MONOCYTES NFR BLD AUTO: 5 % (ref 4–12)
MUCOUS THREADS UR QL AUTO: ABNORMAL
NEUTROPHILS # BLD AUTO: 7.28 THOUSANDS/ÂΜL (ref 1.85–7.62)
NEUTS SEG NFR BLD AUTO: 76 % (ref 43–75)
NITRITE UR QL STRIP: NEGATIVE
NON-SQ EPI CELLS URNS QL MICRO: ABNORMAL /HPF
NRBC BLD AUTO-RTO: 0 /100 WBCS
PH UR STRIP.AUTO: 6 [PH]
PLATELET # BLD AUTO: 230 THOUSANDS/UL (ref 149–390)
PMV BLD AUTO: 10 FL (ref 8.9–12.7)
PROT UR STRIP-MCNC: ABNORMAL MG/DL
RBC # BLD AUTO: 4.5 MILLION/UL (ref 3.81–5.12)
RBC #/AREA URNS AUTO: ABNORMAL /HPF
RH BLD: POSITIVE
RUBV IGG SERPL IA-ACNC: 91.6 IU/ML
SP GR UR STRIP.AUTO: >=1.03 (ref 1–1.03)
SPECIMEN EXPIRATION DATE: NORMAL
UROBILINOGEN UR STRIP-ACNC: <2 MG/DL
WBC # BLD AUTO: 9.65 THOUSAND/UL (ref 4.31–10.16)
WBC #/AREA URNS AUTO: ABNORMAL /HPF

## 2025-05-05 PROCEDURE — 86706 HEP B SURFACE ANTIBODY: CPT

## 2025-05-05 PROCEDURE — 36415 COLL VENOUS BLD VENIPUNCTURE: CPT

## 2025-05-05 PROCEDURE — 86803 HEPATITIS C AB TEST: CPT

## 2025-05-05 PROCEDURE — 87340 HEPATITIS B SURFACE AG IA: CPT

## 2025-05-05 PROCEDURE — 87147 CULTURE TYPE IMMUNOLOGIC: CPT

## 2025-05-05 PROCEDURE — 86900 BLOOD TYPING SEROLOGIC ABO: CPT

## 2025-05-05 PROCEDURE — 87086 URINE CULTURE/COLONY COUNT: CPT

## 2025-05-05 PROCEDURE — 82105 ALPHA-FETOPROTEIN SERUM: CPT

## 2025-05-05 PROCEDURE — 87389 HIV-1 AG W/HIV-1&-2 AB AG IA: CPT

## 2025-05-05 PROCEDURE — 85025 COMPLETE CBC W/AUTO DIFF WBC: CPT

## 2025-05-05 PROCEDURE — 81001 URINALYSIS AUTO W/SCOPE: CPT

## 2025-05-05 PROCEDURE — 86780 TREPONEMA PALLIDUM: CPT

## 2025-05-05 PROCEDURE — 86901 BLOOD TYPING SEROLOGIC RH(D): CPT

## 2025-05-05 PROCEDURE — 86762 RUBELLA ANTIBODY: CPT

## 2025-05-05 PROCEDURE — 86850 RBC ANTIBODY SCREEN: CPT

## 2025-05-06 ENCOUNTER — ROUTINE PRENATAL (OUTPATIENT)
Age: 40
End: 2025-05-06
Payer: COMMERCIAL

## 2025-05-06 ENCOUNTER — APPOINTMENT (OUTPATIENT)
Dept: LAB | Facility: HOSPITAL | Age: 40
End: 2025-05-06
Payer: COMMERCIAL

## 2025-05-06 VITALS — SYSTOLIC BLOOD PRESSURE: 114 MMHG | WEIGHT: 201 LBS | DIASTOLIC BLOOD PRESSURE: 62 MMHG | BODY MASS INDEX: 31.48 KG/M2

## 2025-05-06 DIAGNOSIS — O09.522 MULTIGRAVIDA OF ADVANCED MATERNAL AGE IN SECOND TRIMESTER: ICD-10-CM

## 2025-05-06 DIAGNOSIS — O09.522 MULTIGRAVIDA OF ADVANCED MATERNAL AGE IN SECOND TRIMESTER: Primary | ICD-10-CM

## 2025-05-06 DIAGNOSIS — O99.210 OBESITY IN PREGNANCY: ICD-10-CM

## 2025-05-06 DIAGNOSIS — Z34.81 MULTIGRAVIDA IN FIRST TRIMESTER: ICD-10-CM

## 2025-05-06 DIAGNOSIS — Z87.59 HISTORY OF DELIVERY OF MACROSOMAL INFANT: ICD-10-CM

## 2025-05-06 LAB
BACTERIA UR CULT: ABNORMAL
GLUCOSE 1H P 50 G GLC PO SERPL-MCNC: 89 MG/DL (ref 70–134)
HBV SURFACE AB SER-ACNC: 364 MIU/ML
HBV SURFACE AG SER QL: NORMAL
HCV AB SER QL: NORMAL
HIV 1+2 AB+HIV1 P24 AG SERPL QL IA: NORMAL
SL AMB  POCT GLUCOSE, UA: NORMAL
SL AMB POCT URINE PROTEIN: NORMAL
TREPONEMA PALLIDUM IGG+IGM AB [PRESENCE] IN SERUM OR PLASMA BY IMMUNOASSAY: NORMAL

## 2025-05-06 PROCEDURE — 82950 GLUCOSE TEST: CPT

## 2025-05-06 PROCEDURE — 81002 URINALYSIS NONAUTO W/O SCOPE: CPT | Performed by: OBSTETRICS & GYNECOLOGY

## 2025-05-06 PROCEDURE — 36415 COLL VENOUS BLD VENIPUNCTURE: CPT

## 2025-05-06 PROCEDURE — 99213 OFFICE O/P EST LOW 20 MIN: CPT | Performed by: OBSTETRICS & GYNECOLOGY

## 2025-05-07 ENCOUNTER — RESULTS FOLLOW-UP (OUTPATIENT)
Age: 40
End: 2025-05-07

## 2025-05-07 DIAGNOSIS — N30.00 ACUTE CYSTITIS WITHOUT HEMATURIA: Primary | ICD-10-CM

## 2025-05-07 PROBLEM — R82.71 GROUP B STREPTOCOCCAL BACTERIURIA: Status: ACTIVE | Noted: 2025-05-07

## 2025-05-07 LAB
2ND TRIMESTER 4 SCREEN SERPL-IMP: NORMAL
AFP ADJ MOM SERPL: 0.89
AFP INTERP AMN-IMP: NORMAL
AFP INTERP SERPL-IMP: NORMAL
AFP INTERP SERPL-IMP: NORMAL
AFP SERPL-MCNC: 52.7 NG/ML
AGE AT DELIVERY: 38 YR
GA METHOD: NORMAL
GA: 21.6 WEEKS
IDDM PATIENT QL: NO
MULTIPLE PREGNANCY: NO
NEURAL TUBE DEFECT RISK FETUS: NORMAL %

## 2025-05-07 RX ORDER — AMPICILLIN 500 MG/1
500 CAPSULE ORAL 4 TIMES DAILY
Qty: 28 CAPSULE | Refills: 0 | Status: SHIPPED | OUTPATIENT
Start: 2025-05-07 | End: 2025-05-14

## 2025-05-27 ENCOUNTER — ROUTINE PRENATAL (OUTPATIENT)
Age: 40
End: 2025-05-27
Payer: COMMERCIAL

## 2025-05-27 VITALS — BODY MASS INDEX: 32.01 KG/M2 | SYSTOLIC BLOOD PRESSURE: 104 MMHG | DIASTOLIC BLOOD PRESSURE: 68 MMHG | WEIGHT: 204.4 LBS

## 2025-05-27 DIAGNOSIS — Z11.3 SCREENING FOR STDS (SEXUALLY TRANSMITTED DISEASES): ICD-10-CM

## 2025-05-27 DIAGNOSIS — O12.02 EDEMA DURING PREGNANCY IN SECOND TRIMESTER: ICD-10-CM

## 2025-05-27 DIAGNOSIS — O09.522 MULTIGRAVIDA OF ADVANCED MATERNAL AGE IN SECOND TRIMESTER: Primary | ICD-10-CM

## 2025-05-27 LAB
SL AMB  POCT GLUCOSE, UA: NORMAL
SL AMB POCT URINE PROTEIN: NORMAL

## 2025-05-27 PROCEDURE — 81002 URINALYSIS NONAUTO W/O SCOPE: CPT | Performed by: OBSTETRICS & GYNECOLOGY

## 2025-05-27 PROCEDURE — 99213 OFFICE O/P EST LOW 20 MIN: CPT | Performed by: OBSTETRICS & GYNECOLOGY

## 2025-05-27 NOTE — PROGRESS NOTES
Pt reports +FM, occa BH ctx.  Denies LOF or VB.  Has been worried about wt gain.     Discussed ASA therapy until 36wks because of benefits with AMA and risk or PreE.  Concerned as had PPH x 2 and also very heavy periods.  Discussed H/o PPH:  will need Cytotec, TXA, and possible Methergine immediately after delivery.      Requests left salpingectomy/TL @ time of delivery (h/o right ectopic pregnancy with salpingectomy 2019).  MA-31 signed today.      For 28wks labs.  Continue ASA.     PTL, wt gain, increase protein/diet reviewed.

## 2025-05-27 NOTE — PROGRESS NOTES
PN visit  24w/5d    Anatomy US completed 4/30/25  Growth scan scheduled 7/23/25  AFP completed; results neg  28 week labs ordered  She c/o BH contractions and swelling in her hands and feet; denies spotting or loss of fluid  + fetal movement

## 2025-06-23 ENCOUNTER — TELEPHONE (OUTPATIENT)
Age: 40
End: 2025-06-23

## 2025-07-01 ENCOUNTER — TELEPHONE (OUTPATIENT)
Age: 40
End: 2025-07-01

## 2025-07-01 NOTE — TELEPHONE ENCOUNTER
Called & spoke with Fely. No show for appt last week, 28 week labs not completed. Patient stated she's been very busy. Reminder given to complete labs. Offered July 2, she can only come in Monday or Tuesday due to work. Next available appt is  in [place] July 14 w/Dr. Ashby. Patient will take that appt; will complete labs. Advised she does not need to fast for lab work. Pt verbalized understanding.

## 2025-07-08 ENCOUNTER — APPOINTMENT (OUTPATIENT)
Dept: LAB | Facility: HOSPITAL | Age: 40
End: 2025-07-08
Payer: COMMERCIAL

## 2025-07-08 DIAGNOSIS — O09.522 MULTIGRAVIDA OF ADVANCED MATERNAL AGE IN SECOND TRIMESTER: ICD-10-CM

## 2025-07-08 DIAGNOSIS — Z87.59 HISTORY OF DELIVERY OF MACROSOMAL INFANT: ICD-10-CM

## 2025-07-08 DIAGNOSIS — Z34.81 MULTIGRAVIDA IN FIRST TRIMESTER: Primary | ICD-10-CM

## 2025-07-08 DIAGNOSIS — O99.210 OBESITY IN PREGNANCY: ICD-10-CM

## 2025-07-08 DIAGNOSIS — Z11.3 SCREENING FOR STDS (SEXUALLY TRANSMITTED DISEASES): ICD-10-CM

## 2025-07-08 LAB
ABO GROUP BLD: NORMAL
BACTERIA UR QL AUTO: ABNORMAL /HPF
BASOPHILS # BLD AUTO: 0.01 THOUSANDS/ÂΜL (ref 0–0.1)
BASOPHILS NFR BLD AUTO: 0 % (ref 0–1)
BILIRUB UR QL STRIP: NEGATIVE
BLD GP AB SCN SERPL QL: NEGATIVE
CLARITY UR: ABNORMAL
COLOR UR: YELLOW
EOSINOPHIL # BLD AUTO: 0.06 THOUSAND/ÂΜL (ref 0–0.61)
EOSINOPHIL NFR BLD AUTO: 1 % (ref 0–6)
ERYTHROCYTE [DISTWIDTH] IN BLOOD BY AUTOMATED COUNT: 15 % (ref 11.6–15.1)
GLUCOSE 1H P 50 G GLC PO SERPL-MCNC: 110 MG/DL (ref 70–134)
GLUCOSE UR STRIP-MCNC: NEGATIVE MG/DL
GRAN CASTS #/AREA URNS LPF: ABNORMAL /[LPF]
HCT VFR BLD AUTO: 37.7 % (ref 34.8–46.1)
HGB BLD-MCNC: 12.2 G/DL (ref 11.5–15.4)
HGB UR QL STRIP.AUTO: NEGATIVE
HYALINE CASTS #/AREA URNS LPF: ABNORMAL /LPF
IMM GRANULOCYTES # BLD AUTO: 0.06 THOUSAND/UL (ref 0–0.2)
IMM GRANULOCYTES NFR BLD AUTO: 1 % (ref 0–2)
KETONES UR STRIP-MCNC: ABNORMAL MG/DL
LEUKOCYTE ESTERASE UR QL STRIP: ABNORMAL
LYMPHOCYTES # BLD AUTO: 1.44 THOUSANDS/ÂΜL (ref 0.6–4.47)
LYMPHOCYTES NFR BLD AUTO: 15 % (ref 14–44)
MCH RBC QN AUTO: 27.4 PG (ref 26.8–34.3)
MCHC RBC AUTO-ENTMCNC: 32.4 G/DL (ref 31.4–37.4)
MCV RBC AUTO: 85 FL (ref 82–98)
MONOCYTES # BLD AUTO: 0.48 THOUSAND/ÂΜL (ref 0.17–1.22)
MONOCYTES NFR BLD AUTO: 5 % (ref 4–12)
MUCOUS THREADS UR QL AUTO: ABNORMAL
NEUTROPHILS # BLD AUTO: 7.29 THOUSANDS/ÂΜL (ref 1.85–7.62)
NEUTS SEG NFR BLD AUTO: 78 % (ref 43–75)
NITRITE UR QL STRIP: NEGATIVE
NON-SQ EPI CELLS URNS QL MICRO: ABNORMAL /HPF
NRBC BLD AUTO-RTO: 0 /100 WBCS
PH UR STRIP.AUTO: 6.5 [PH]
PLATELET # BLD AUTO: 226 THOUSANDS/UL (ref 149–390)
PMV BLD AUTO: 9.8 FL (ref 8.9–12.7)
PROT UR STRIP-MCNC: ABNORMAL MG/DL
RBC # BLD AUTO: 4.46 MILLION/UL (ref 3.81–5.12)
RBC #/AREA URNS AUTO: ABNORMAL /HPF
RH BLD: POSITIVE
RUBV IGG SERPL IA-ACNC: 101.7 IU/ML
SP GR UR STRIP.AUTO: 1.02 (ref 1–1.03)
SPECIMEN EXPIRATION DATE: NORMAL
UROBILINOGEN UR STRIP-ACNC: <2 MG/DL
WBC # BLD AUTO: 9.34 THOUSAND/UL (ref 4.31–10.16)
WBC #/AREA URNS AUTO: ABNORMAL /HPF

## 2025-07-08 PROCEDURE — 82950 GLUCOSE TEST: CPT

## 2025-07-08 PROCEDURE — 86706 HEP B SURFACE ANTIBODY: CPT

## 2025-07-08 PROCEDURE — 36415 COLL VENOUS BLD VENIPUNCTURE: CPT

## 2025-07-08 PROCEDURE — 87340 HEPATITIS B SURFACE AG IA: CPT

## 2025-07-08 PROCEDURE — 85025 COMPLETE CBC W/AUTO DIFF WBC: CPT

## 2025-07-08 PROCEDURE — 86850 RBC ANTIBODY SCREEN: CPT

## 2025-07-08 PROCEDURE — 86900 BLOOD TYPING SEROLOGIC ABO: CPT

## 2025-07-08 PROCEDURE — 87086 URINE CULTURE/COLONY COUNT: CPT

## 2025-07-08 PROCEDURE — 86762 RUBELLA ANTIBODY: CPT

## 2025-07-08 PROCEDURE — 86901 BLOOD TYPING SEROLOGIC RH(D): CPT

## 2025-07-08 PROCEDURE — 86780 TREPONEMA PALLIDUM: CPT

## 2025-07-08 PROCEDURE — 81001 URINALYSIS AUTO W/SCOPE: CPT

## 2025-07-08 PROCEDURE — 87077 CULTURE AEROBIC IDENTIFY: CPT

## 2025-07-09 LAB
HBV SURFACE AB SER-ACNC: 307 MIU/ML
HBV SURFACE AG SER QL: NORMAL
TREPONEMA PALLIDUM IGG+IGM AB [PRESENCE] IN SERUM OR PLASMA BY IMMUNOASSAY: NORMAL

## 2025-07-10 LAB — BACTERIA UR CULT: ABNORMAL

## 2025-07-14 ENCOUNTER — ROUTINE PRENATAL (OUTPATIENT)
Age: 40
End: 2025-07-14
Payer: COMMERCIAL

## 2025-07-14 ENCOUNTER — TELEPHONE (OUTPATIENT)
Dept: OBGYN CLINIC | Facility: MEDICAL CENTER | Age: 40
End: 2025-07-14

## 2025-07-14 VITALS — WEIGHT: 211.6 LBS | DIASTOLIC BLOOD PRESSURE: 68 MMHG | BODY MASS INDEX: 33.14 KG/M2 | SYSTOLIC BLOOD PRESSURE: 104 MMHG

## 2025-07-14 DIAGNOSIS — Z34.83 PRENATAL CARE, SUBSEQUENT PREGNANCY, THIRD TRIMESTER: ICD-10-CM

## 2025-07-14 DIAGNOSIS — O09.523 MULTIGRAVIDA OF ADVANCED MATERNAL AGE IN THIRD TRIMESTER: Primary | ICD-10-CM

## 2025-07-14 PROCEDURE — 99213 OFFICE O/P EST LOW 20 MIN: CPT | Performed by: NURSE PRACTITIONER

## 2025-07-14 NOTE — PROGRESS NOTES
39-year-old -0-1-4 at 31w4d gestation.  AMA    Currently taking antibiotic for UTI.  28-week labs normal  Growth scan 2025    Baby is active, denies leakage of fluid, vaginal bleeding or uterine contractions.  S=D, normal FHTs, normal BP    RV 2w

## 2025-07-14 NOTE — TELEPHONE ENCOUNTER
3RD TRIMESTER CHECK-IN CALL      Overall how are you feeling? Patient states she is doing well.    Compliant with routine OB appointments? No, reviewed appointment schedule and importance of routine prenatal care.    Have you completed your 3rd trimester lab work? Yes    Have you reviewed the contents of 3rd trimester folder from office?  Yes    Have you decided on a pediatrician? Yes     If yes, [facility]    Questions on paperwork to go back to office? No    Questions on the baby birth certificate forms? No    Sent link for the Hospital Readiness Video via [employer]

## 2025-07-14 NOTE — PROGRESS NOTES
PN visit  31w4d  28 wk labs completed  Growth scan scheduled 7/23/25  Yellow folder given today  Delivery consent signed at next visit.   Tdap declined  Plans to breastfeed    Denies loss of fluid, bleeding or contractions  + brock piedra  + fetal movement

## 2025-07-23 ENCOUNTER — TELEPHONE (OUTPATIENT)
Age: 40
End: 2025-07-23

## 2025-07-23 ENCOUNTER — ANCILLARY PROCEDURE (OUTPATIENT)
Dept: PERINATAL CARE | Facility: OTHER | Age: 40
End: 2025-07-23
Attending: OBSTETRICS & GYNECOLOGY
Payer: COMMERCIAL

## 2025-07-23 VITALS
SYSTOLIC BLOOD PRESSURE: 112 MMHG | HEIGHT: 67 IN | BODY MASS INDEX: 33.4 KG/M2 | HEART RATE: 72 BPM | DIASTOLIC BLOOD PRESSURE: 56 MMHG | WEIGHT: 212.8 LBS

## 2025-07-23 DIAGNOSIS — Z3A.32 32 WEEKS GESTATION OF PREGNANCY: Primary | ICD-10-CM

## 2025-07-23 DIAGNOSIS — Z3A.32 32 WEEKS GESTATION OF PREGNANCY: ICD-10-CM

## 2025-07-23 PROCEDURE — NC001 PR NO CHARGE: Performed by: OBSTETRICS & GYNECOLOGY

## 2025-07-23 PROCEDURE — 99213 OFFICE O/P EST LOW 20 MIN: CPT | Performed by: OBSTETRICS & GYNECOLOGY

## 2025-07-23 PROCEDURE — 76816 OB US FOLLOW-UP PER FETUS: CPT | Performed by: OBSTETRICS & GYNECOLOGY

## 2025-07-23 NOTE — PROGRESS NOTES
"On exam today, the patient appears well, in no acute distress, and denies any complaints.     There has been accelerated interval fetal growth with biometry consistent with evolving proportional macrosomia. Good fetal movement and tone are seen. The amniotic fluid volume appears normal. The patient was informed of today's findings and all of her questions were answered. The limitations of ultrasound were reviewed.  labor precautions and fetal kick counts were reviewed.    We discussed follow-up in detail and I recommend the patient return in 4 weeks for a growth ultrasound secondary to evolving macrosomia. She was scheduled for this study after today's ultrasound.    Thank you very much for allowing us to participate in the care of this very nice patient. Should you have any questions, please do not hesitate to contact our office.    Portions of the record may have been created with voice recognition software. Occasional wrong word or \"sound a like\" substitutions may have occurred due to the inherent limitations of voice recognition software. Read the chart carefully and recognize, using context, where substitutions have occurred.  "

## 2025-07-24 ENCOUNTER — TELEPHONE (OUTPATIENT)
Age: 40
End: 2025-07-24

## 2025-07-24 NOTE — TELEPHONE ENCOUNTER
Called patient and left a voicemail message to call me back or the office so we can schedule  appointments with  for her OB visits    immune

## 2025-08-01 ENCOUNTER — TELEPHONE (OUTPATIENT)
Age: 40
End: 2025-08-01

## 2025-08-13 ENCOUNTER — ROUTINE PRENATAL (OUTPATIENT)
Age: 40
End: 2025-08-13
Payer: COMMERCIAL

## 2025-08-18 ENCOUNTER — ULTRASOUND (OUTPATIENT)
Dept: PERINATAL CARE | Facility: OTHER | Age: 40
End: 2025-08-18
Attending: OBSTETRICS & GYNECOLOGY
Payer: COMMERCIAL

## 2025-08-18 VITALS
SYSTOLIC BLOOD PRESSURE: 120 MMHG | WEIGHT: 221.4 LBS | HEART RATE: 89 BPM | HEIGHT: 67 IN | BODY MASS INDEX: 34.75 KG/M2 | DIASTOLIC BLOOD PRESSURE: 74 MMHG

## 2025-08-18 DIAGNOSIS — Z3A.36 36 WEEKS GESTATION OF PREGNANCY: Primary | ICD-10-CM

## 2025-08-18 DIAGNOSIS — Z3A.36 36 WEEKS GESTATION OF PREGNANCY: ICD-10-CM

## 2025-08-18 DIAGNOSIS — Z36.89 ENCOUNTER FOR ULTRASOUND TO CHECK FETAL GROWTH: ICD-10-CM

## 2025-08-18 DIAGNOSIS — O09.522 MULTIGRAVIDA OF ADVANCED MATERNAL AGE IN SECOND TRIMESTER: ICD-10-CM

## 2025-08-18 PROCEDURE — NC001 PR NO CHARGE: Performed by: OBSTETRICS & GYNECOLOGY

## 2025-08-18 PROCEDURE — 76816 OB US FOLLOW-UP PER FETUS: CPT | Performed by: OBSTETRICS & GYNECOLOGY

## 2025-08-21 ENCOUNTER — ROUTINE PRENATAL (OUTPATIENT)
Age: 40
End: 2025-08-21
Payer: COMMERCIAL

## 2025-08-21 VITALS — WEIGHT: 219.2 LBS | DIASTOLIC BLOOD PRESSURE: 72 MMHG | SYSTOLIC BLOOD PRESSURE: 104 MMHG | BODY MASS INDEX: 34.33 KG/M2

## 2025-08-21 DIAGNOSIS — Z34.83 PRENATAL CARE, SUBSEQUENT PREGNANCY, THIRD TRIMESTER: Primary | ICD-10-CM

## 2025-08-21 PROCEDURE — 99213 OFFICE O/P EST LOW 20 MIN: CPT | Performed by: OBSTETRICS & GYNECOLOGY
